# Patient Record
Sex: MALE | Race: WHITE
[De-identification: names, ages, dates, MRNs, and addresses within clinical notes are randomized per-mention and may not be internally consistent; named-entity substitution may affect disease eponyms.]

---

## 2017-12-26 ENCOUNTER — HOSPITAL ENCOUNTER (OUTPATIENT)
Dept: HOSPITAL 47 - LABPAT | Age: 72
Discharge: HOME | End: 2017-12-26
Payer: MEDICARE

## 2017-12-26 DIAGNOSIS — I35.0: ICD-10-CM

## 2017-12-26 DIAGNOSIS — Z01.812: Primary | ICD-10-CM

## 2017-12-26 LAB
ANION GAP SERPL CALC-SCNC: 5 MMOL/L
BUN SERPL-SCNC: 29 MG/DL (ref 9–20)
CH: 30.7
CHCM: 32.7
CHLORIDE SERPL-SCNC: 104 MMOL/L (ref 98–107)
CO2 SERPL-SCNC: 30 MMOL/L (ref 22–30)
ERYTHROCYTE [DISTWIDTH] IN BLOOD BY AUTOMATED COUNT: 4.72 M/UL (ref 4.3–5.9)
ERYTHROCYTE [DISTWIDTH] IN BLOOD: 14 % (ref 11.5–15.5)
HCT VFR BLD AUTO: 44.6 % (ref 39–53)
HDW: 3.14
HGB BLD-MCNC: 14.7 GM/DL (ref 13–17.5)
MCH RBC QN AUTO: 31.2 PG (ref 25–35)
MCHC RBC AUTO-ENTMCNC: 33 G/DL (ref 31–37)
MCV RBC AUTO: 94.4 FL (ref 80–100)
NON-AFRICAN AMERICAN GFR(MDRD): >60
POTASSIUM SERPL-SCNC: 5.3 MMOL/L (ref 3.5–5.1)
SODIUM SERPL-SCNC: 139 MMOL/L (ref 137–145)
WBC # BLD AUTO: 7.2 K/UL (ref 3.8–10.6)

## 2017-12-26 PROCEDURE — 80051 ELECTROLYTE PANEL: CPT

## 2017-12-26 PROCEDURE — 36415 COLL VENOUS BLD VENIPUNCTURE: CPT

## 2017-12-26 PROCEDURE — 84520 ASSAY OF UREA NITROGEN: CPT

## 2017-12-26 PROCEDURE — 85027 COMPLETE CBC AUTOMATED: CPT

## 2017-12-26 PROCEDURE — 82565 ASSAY OF CREATININE: CPT

## 2018-01-02 ENCOUNTER — HOSPITAL ENCOUNTER (OUTPATIENT)
Dept: HOSPITAL 47 - CATHCVL | Age: 73
Discharge: HOME | End: 2018-01-02
Payer: MEDICARE

## 2018-01-02 VITALS — BODY MASS INDEX: 28.7 KG/M2

## 2018-01-02 DIAGNOSIS — I08.0: ICD-10-CM

## 2018-01-02 DIAGNOSIS — Z82.49: ICD-10-CM

## 2018-01-02 DIAGNOSIS — I10: ICD-10-CM

## 2018-01-02 DIAGNOSIS — E78.00: ICD-10-CM

## 2018-01-02 DIAGNOSIS — D86.9: ICD-10-CM

## 2018-01-02 DIAGNOSIS — Z88.0: ICD-10-CM

## 2018-01-02 DIAGNOSIS — Z79.51: ICD-10-CM

## 2018-01-02 DIAGNOSIS — Z87.891: ICD-10-CM

## 2018-01-02 DIAGNOSIS — Z88.6: ICD-10-CM

## 2018-01-02 DIAGNOSIS — I27.20: Primary | ICD-10-CM

## 2018-01-02 DIAGNOSIS — Z88.1: ICD-10-CM

## 2018-01-02 DIAGNOSIS — Z79.899: ICD-10-CM

## 2018-01-02 DIAGNOSIS — Z79.82: ICD-10-CM

## 2018-01-02 LAB
SAO2 % BLDA: 63.2 %
SAO2 % BLDA: 70.4 %
SAO2 % BLDA: 93.2 %

## 2018-01-02 PROCEDURE — 93456 R HRT CORONARY ARTERY ANGIO: CPT

## 2018-01-02 PROCEDURE — 93320 DOPPLER ECHO COMPLETE: CPT

## 2018-01-02 PROCEDURE — 93312 ECHO TRANSESOPHAGEAL: CPT

## 2018-01-02 PROCEDURE — 93325 DOPPLER ECHO COLOR FLOW MAPG: CPT

## 2018-01-02 PROCEDURE — 85018 HEMOGLOBIN: CPT

## 2018-01-02 PROCEDURE — 82810 BLOOD GASES O2 SAT ONLY: CPT

## 2018-01-02 RX ADMIN — MIDAZOLAM ONE MG: 1 INJECTION INTRAMUSCULAR; INTRAVENOUS at 07:54

## 2018-01-02 RX ADMIN — BENZOCAINE ONE SPRAY: 200 SPRAY DENTAL; ORAL; PERIODONTAL at 11:38

## 2018-01-02 RX ADMIN — BENZOCAINE ONE SPRAY: 200 SPRAY DENTAL; ORAL; PERIODONTAL at 13:17

## 2018-01-02 RX ADMIN — BENZOCAINE ONE SPRAY: 200 SPRAY DENTAL; ORAL; PERIODONTAL at 13:23

## 2018-01-02 RX ADMIN — MIDAZOLAM ONE MG: 1 INJECTION INTRAMUSCULAR; INTRAVENOUS at 07:40

## 2018-01-02 NOTE — ECHOT
TRANSESOPHAGEAL ECHOCARDIOGRAM



Mr. Brar is a 72-year-old gentleman who underwent a transesophageal echocardiogram

to assess aortic stenosis.  Patient was given intravenous sedation with Versed and

fentanyl and transesophageal echocardiogram was performed without any complications.

Left ventricular chamber is normal in size with moderate degree of left ventricular

hypertrophy and normal left ventricular systolic function.  The mitral valve morphology

is normal.  Left atrium is mildly enlarged.  Left atrial appendage is normal.  There is

minimal mitral regurgitation noted.  Aortic valve is heavily calcified.  Aortic valve

area is calculated in the range of 1.4 to 1.5 cm2 suggestive of moderate degree of

aortic stenosis.  No significant aortic regurgitation is noted.  Interatrial septum is

intact.  There is no evidence of any PFO.  Descending thoracic aorta is normal.



FINAL IMPRESSION:

1. The aortic valve is heavily calcified but aortic wall area is calculated in the

    range of 1.4 to 1.5 cm2 suggestive of moderate degree of aortic stenosis.  No

    significant aortic regurgitation is noted.

2. Mitral and tricuspid valve morphology is normal.  There is a trace mitral

    regurgitation noted.  Left atrium is mildly enlarged.

3. Interatrial septum is intact.  There is no evidence of any patent foramen ovale.

4. Descending thoracic aorta was normal.





MMODL / IJN: [id number] / Job#: [id number]

## 2018-01-02 NOTE — LTR
DATE OF SERVICE:

01/02/18



Dear Dr. Cassidy:



Thank you for the opportunity to partake in the care of Mr. Brar.  Please find

enclosed my cardiac cath report for your records.



As you recall, the gentleman has hypertension, hypercholesterolemia and moderate to

severe aortic stenosis.



Cardiac cath reveals no significant obstructive coronary artery disease.  Filling

pressures are slightly elevated.  We will check the FAIZAN and then make further

recommendations.



Thank you for your referral and please call for questions.



With kind personal regards,



Sincerely,





Moderate conscious sedation time was 48 minutes.





MMODL / IJN: [id number] / Job#: [id number]

## 2018-01-02 NOTE — CC
CARDIAC CATHETERIZATION REPORT



DATE OF SERVICE:

01/02/2018.



PROCEDURE:

Right and left heart catheterization and coronary angiography.



PERFORMED BY:

Dr. POORNIMA Garcia.



CLINICAL INFORMATION:

Mr. Link Brar is a 72-year-old gentleman with history of hypertension,

hyperlipidemia, and aortic stenosis with increasing shortness of breath and worsening

gradient noninvasively with a mean gradient of 37 mm mmHg.  He was advised coronary

angiography.  He had a previous cardiac cath and FAIZAN in 2014, which revealed that the

valve area was about nearly 2.0 and did not have significant disease of the right-

dominant system.  Because of increasing symptoms and objective evidence of increased

gradient, he was advised coronary angiography and a FAIZAN and possibly to cross aortic

valve if he could.



PROCEDURE NOTE:

Under local anesthesia and strict aseptic precautions, a 6-Romansh introducer was placed

in the right femoral artery.  An 8-Romansh introducer in the right femoral vein.  Using

a standard balloon tipped catheter, I performed right heart catheterization.  Catheter

was left in the pulmonary artery.  I obtained saturations, hemodynamics and also

performed a thermodilution cardiac output.  I then performed coronary angiography using

standard Candy catheters.  I made an attempt to cross the aortic valve with a pigtail

catheter but I could not.  The sheath was taken out and Angio-Seal device used to

secure hemostasis.  The venous sheath was taken out and manual compression.  The

patient was sent to the room in a stable condition.  He will have transesophageal echo

later on today around noontime.



CARDIAC CATHETERIZATION FINDINGS:

1. Right coronary artery:  Technically a very dominant vessel has no significant

    disease and distally bifurcates into a large PDA and a larger PLV and PDA, both of

    which supply a sizable amount of myocardium.  There is no significant disease in

    the dominant RCA or its branches.

2. Left main coronary artery: Short patent disease-free vessel that bifurcates into

    LAD and circumflex.  Left main itself is free of significant disease.

3. Left anterior descending coronary artery:  Good caliber vessel extends along the

    anterior wall gives off a good-sized septal branch proximally and then 2 small

    diagonal branches.  The second diagonal is actually of fair caliber and

    distribution.  The vessel then runs down all the way to the apex supplying a

    sizable amount of myocardium.  The entire LAD is of a good caliber and good

    distribution, has minor irregularities but no significant disease is noted.

4. Left posterior circumflex coronary artery:  Technically a nondominant vessel that

    runs laterally, gives off 2 obtuse marginal branches, supplies a fair amount of

    myocardium.  There is no significant disease in the nondominant circumflex system.

5. Left ventriculogram.  This was not performed.  I could not cross the aortic valve.

6. Right heart catheterization findings:  Right atrial pressure was 3 mmHg.  Right

    ventricular pressure was 44/3, the pulmonary arterial pressure was 44/17 with a

    mean of 27.  Pulmonary capillary wedge pressure was 16 mmHg.  The thermodilution

    cardiac output was 5.4 L.  The [place] cardiac output was 5.5 L.  The PA saturation

    was 70% and FA saturation was 93%.  The RA saturation was 63%.

This patient has mild pulmonary hypertension with a normal cardiac output and mildly

increased wedge pressure.



FINAL IMPRESSION:

This patient has mild pulmonary hypertension, normal cardiac output.  Aortic valve was

not crossed.  He has no obstructive CAD.  He has a right dominant system and compared

to the study from 2014,  there is no progression of coronary disease.



RECOMMENDATIONS:

I will await the results of the transesophageal echo before making recommendations.

Patient's PA pressures are slightly increased, but his coronary anatomy has not

changed.  He has a right dominant system without significant obstructive CAD.  Findings

were discussed with the patient and wife and he will be discharged after FAIZAN and I will

see him on Friday as per his appointment.





MMODL / IJN: [id number] / Job#: [id number]

## 2018-01-03 VITALS
SYSTOLIC BLOOD PRESSURE: 174 MMHG | HEART RATE: 53 BPM | DIASTOLIC BLOOD PRESSURE: 76 MMHG | TEMPERATURE: 97.9 F | RESPIRATION RATE: 18 BRPM

## 2018-01-22 ENCOUNTER — HOSPITAL ENCOUNTER (OUTPATIENT)
Dept: HOSPITAL 47 - LABWHC1 | Age: 73
Discharge: HOME | End: 2018-01-22
Payer: MEDICARE

## 2018-01-22 DIAGNOSIS — E78.5: Primary | ICD-10-CM

## 2018-01-22 DIAGNOSIS — Z12.5: ICD-10-CM

## 2018-01-22 DIAGNOSIS — I10: ICD-10-CM

## 2018-01-22 LAB
ALT SERPL-CCNC: 40 U/L (ref 21–72)
ANION GAP SERPL CALC-SCNC: 7 MMOL/L
AST SERPL-CCNC: 19 U/L (ref 17–59)
BASOPHILS # BLD AUTO: 0.1 K/UL (ref 0–0.2)
BASOPHILS NFR BLD AUTO: 1 %
BUN SERPL-SCNC: 24 MG/DL (ref 9–20)
CALCIUM SPEC-MCNC: 10.6 MG/DL (ref 8.4–10.2)
CHLORIDE SERPL-SCNC: 103 MMOL/L (ref 98–107)
CHOLEST SERPL-MCNC: 152 MG/DL (ref ?–200)
CO2 SERPL-SCNC: 33 MMOL/L (ref 22–30)
EOSINOPHIL # BLD AUTO: 0.2 K/UL (ref 0–0.7)
EOSINOPHIL NFR BLD AUTO: 3 %
ERYTHROCYTE [DISTWIDTH] IN BLOOD BY AUTOMATED COUNT: 5.37 M/UL (ref 4.3–5.9)
ERYTHROCYTE [DISTWIDTH] IN BLOOD: 13.8 % (ref 11.5–15.5)
GLUCOSE SERPL-MCNC: 109 MG/DL (ref 74–99)
HCT VFR BLD AUTO: 49.2 % (ref 39–53)
HDLC SERPL-MCNC: 52 MG/DL (ref 40–60)
HGB BLD-MCNC: 16 GM/DL (ref 13–17.5)
LDLC SERPL CALC-MCNC: 73 MG/DL (ref 0–99)
LYMPHOCYTES # SPEC AUTO: 1.6 K/UL (ref 1–4.8)
LYMPHOCYTES NFR SPEC AUTO: 23 %
MCH RBC QN AUTO: 29.9 PG (ref 25–35)
MCHC RBC AUTO-ENTMCNC: 32.6 G/DL (ref 31–37)
MCV RBC AUTO: 91.6 FL (ref 80–100)
MONOCYTES # BLD AUTO: 0.5 K/UL (ref 0–1)
MONOCYTES NFR BLD AUTO: 7 %
NEUTROPHILS # BLD AUTO: 4.2 K/UL (ref 1.3–7.7)
NEUTROPHILS NFR BLD AUTO: 64 %
PLATELET # BLD AUTO: 207 K/UL (ref 150–450)
POTASSIUM SERPL-SCNC: 4.9 MMOL/L (ref 3.5–5.1)
PSA SERPL-MCNC: 1.29 NG/ML (ref 0–4)
SODIUM SERPL-SCNC: 143 MMOL/L (ref 137–145)
T4 FREE SERPL-MCNC: 1.12 NG/DL (ref 0.78–2.19)
TRIGL SERPL-MCNC: 137 MG/DL (ref ?–150)
WBC # BLD AUTO: 6.6 K/UL (ref 3.8–10.6)

## 2018-01-22 PROCEDURE — 85025 COMPLETE CBC W/AUTO DIFF WBC: CPT

## 2018-01-22 PROCEDURE — 84443 ASSAY THYROID STIM HORMONE: CPT

## 2018-01-22 PROCEDURE — 84439 ASSAY OF FREE THYROXINE: CPT

## 2018-01-22 PROCEDURE — 36415 COLL VENOUS BLD VENIPUNCTURE: CPT

## 2018-01-22 PROCEDURE — 84460 ALANINE AMINO (ALT) (SGPT): CPT

## 2018-01-22 PROCEDURE — 80061 LIPID PANEL: CPT

## 2018-01-22 PROCEDURE — 80048 BASIC METABOLIC PNL TOTAL CA: CPT

## 2018-01-22 PROCEDURE — 84450 TRANSFERASE (AST) (SGOT): CPT

## 2019-02-15 ENCOUNTER — HOSPITAL ENCOUNTER (OUTPATIENT)
Dept: HOSPITAL 47 - RADXRMAIN | Age: 74
Discharge: HOME | End: 2019-02-15
Attending: FAMILY MEDICINE
Payer: MEDICARE

## 2019-02-15 DIAGNOSIS — M25.552: Primary | ICD-10-CM

## 2019-02-15 PROCEDURE — 73502 X-RAY EXAM HIP UNI 2-3 VIEWS: CPT

## 2019-02-15 NOTE — XR
Left hip

 

HISTORY: Left hip pain

 

2 views of the left hip

 

No comparisons

 

There is mild joint space loss. Alignment and bone mineralization are maintained. No fracture or disl
ocation.

 

IMPRESSION: Suspect some mild joint space loss, hip MRI may be of benefit.

## 2019-03-05 ENCOUNTER — HOSPITAL ENCOUNTER (OUTPATIENT)
Dept: HOSPITAL 47 - ORWHC2ENDO | Age: 74
Discharge: HOME | End: 2019-03-05
Payer: MEDICARE

## 2019-03-05 VITALS — RESPIRATION RATE: 16 BRPM | HEART RATE: 58 BPM | DIASTOLIC BLOOD PRESSURE: 81 MMHG | SYSTOLIC BLOOD PRESSURE: 145 MMHG

## 2019-03-05 VITALS — TEMPERATURE: 96.5 F

## 2019-03-05 VITALS — BODY MASS INDEX: 30.9 KG/M2

## 2019-03-05 DIAGNOSIS — D12.5: ICD-10-CM

## 2019-03-05 DIAGNOSIS — K57.30: ICD-10-CM

## 2019-03-05 DIAGNOSIS — Z12.11: Primary | ICD-10-CM

## 2019-03-05 DIAGNOSIS — K59.00: ICD-10-CM

## 2019-03-05 DIAGNOSIS — Z80.0: ICD-10-CM

## 2019-03-05 DIAGNOSIS — K64.8: ICD-10-CM

## 2019-03-05 PROCEDURE — 88305 TISSUE EXAM BY PATHOLOGIST: CPT

## 2019-03-05 PROCEDURE — 45385 COLONOSCOPY W/LESION REMOVAL: CPT

## 2019-03-05 NOTE — P.PCN
Date of Procedure: 03/05/19


Procedure(s) Performed: 


Procedure: Colonoscopy and polypectomy.





Preoperative diagnosis: Screening for neoplasia.





Postoperative diagnosis: 1. Diverticulosis with no evidence of acute 

diverticulitis or strictures.  2. Small sigmoid polyp snared but no large 

polyps or cancer.





Preparation: HalfLytely prep.





Sedation: Was provided by anesthesia.





Brief clinical history: The patient is 73-year-old male who is scheduled for 

this evaluation for screening for neoplasia because of history of polyps and 

family history of colon cancer in his mother and her half brother.  The patient 

has no abdominal complaints, bleeding or anemia.  He reports intermittent 

issues with diarrhea and constipation.  His last exam was in 2016.





Procedure: With the patient on his left lateral decubitus position and after 

informed consent and adequate sedation, the perianal area was inspected and it 

did not show any fissures or fistulas.  There were no masses felt on digital 

rectal examination.  The Olympus CFQ 160L video colonoscope was then inserted 

in the rectum in the usual fashion and advanced to the cecum.  The preparation 

was less than ideal as there was some thick fecal secretions and fecal debris 

that would not wash off totally.  There was a small polyp in the sigmoid which 

was snared and retrieved by suction, but there were no large polyps or cancer.  

Multiple diverticular orifices were seen scattered mostly on the left side with 

some on the right side and around the hepatic flexure but with no evidence of 

acute diverticulitis or strictures.  In the retroflex view in the rectum I 

noted low-grade internal hemorrhoids but those were not bleeding.





Disposition: The patient tolerated the procedure well.





Plan: The patient was reassured.  Discussed dietary measures.  I anticipate 

repeating this exam in 3-5 years.  He will follow up with you as planned.

## 2020-03-09 ENCOUNTER — HOSPITAL ENCOUNTER (OUTPATIENT)
Dept: HOSPITAL 47 - LABPAT | Age: 75
End: 2020-03-09
Attending: INTERNAL MEDICINE
Payer: MEDICARE

## 2020-03-09 DIAGNOSIS — Z01.818: Primary | ICD-10-CM

## 2020-03-09 DIAGNOSIS — I35.0: ICD-10-CM

## 2020-03-09 LAB
ANION GAP SERPL CALC-SCNC: 6 MMOL/L
BUN SERPL-SCNC: 25 MG/DL (ref 9–20)
CHLORIDE SERPL-SCNC: 100 MMOL/L (ref 98–107)
CO2 SERPL-SCNC: 34 MMOL/L (ref 22–30)
ERYTHROCYTE [DISTWIDTH] IN BLOOD BY AUTOMATED COUNT: 5 M/UL (ref 4.3–5.9)
ERYTHROCYTE [DISTWIDTH] IN BLOOD: 14.5 % (ref 11.5–15.5)
HCT VFR BLD AUTO: 46.2 % (ref 39–53)
HGB BLD-MCNC: 15.4 GM/DL (ref 13–17.5)
MCH RBC QN AUTO: 30.7 PG (ref 25–35)
MCHC RBC AUTO-ENTMCNC: 33.3 G/DL (ref 31–37)
MCV RBC AUTO: 92.4 FL (ref 80–100)
PLATELET # BLD AUTO: 237 K/UL (ref 150–450)
POTASSIUM SERPL-SCNC: 4.9 MMOL/L (ref 3.5–5.1)
SODIUM SERPL-SCNC: 140 MMOL/L (ref 137–145)
WBC # BLD AUTO: 10.4 K/UL (ref 3.8–10.6)

## 2020-03-09 PROCEDURE — 85027 COMPLETE CBC AUTOMATED: CPT

## 2020-03-09 PROCEDURE — 36415 COLL VENOUS BLD VENIPUNCTURE: CPT

## 2020-03-09 PROCEDURE — 82565 ASSAY OF CREATININE: CPT

## 2020-03-09 PROCEDURE — 80051 ELECTROLYTE PANEL: CPT

## 2020-03-09 PROCEDURE — 84520 ASSAY OF UREA NITROGEN: CPT

## 2020-03-19 ENCOUNTER — HOSPITAL ENCOUNTER (OUTPATIENT)
Dept: HOSPITAL 47 - CATHCVL | Age: 75
Discharge: HOME | End: 2020-03-19
Attending: INTERNAL MEDICINE
Payer: MEDICARE

## 2020-03-19 VITALS — HEART RATE: 60 BPM | DIASTOLIC BLOOD PRESSURE: 79 MMHG | SYSTOLIC BLOOD PRESSURE: 165 MMHG

## 2020-03-19 VITALS — TEMPERATURE: 98.5 F

## 2020-03-19 VITALS — BODY MASS INDEX: 31.2 KG/M2

## 2020-03-19 VITALS — RESPIRATION RATE: 16 BRPM

## 2020-03-19 DIAGNOSIS — R06.02: ICD-10-CM

## 2020-03-19 DIAGNOSIS — D86.9: ICD-10-CM

## 2020-03-19 DIAGNOSIS — Z87.891: ICD-10-CM

## 2020-03-19 DIAGNOSIS — Z79.51: ICD-10-CM

## 2020-03-19 DIAGNOSIS — E78.00: ICD-10-CM

## 2020-03-19 DIAGNOSIS — Z79.899: ICD-10-CM

## 2020-03-19 DIAGNOSIS — Z82.49: ICD-10-CM

## 2020-03-19 DIAGNOSIS — R07.9: ICD-10-CM

## 2020-03-19 DIAGNOSIS — Z88.1: ICD-10-CM

## 2020-03-19 DIAGNOSIS — Z88.6: ICD-10-CM

## 2020-03-19 DIAGNOSIS — J44.9: ICD-10-CM

## 2020-03-19 DIAGNOSIS — Z79.82: ICD-10-CM

## 2020-03-19 DIAGNOSIS — Z88.0: ICD-10-CM

## 2020-03-19 DIAGNOSIS — I49.5: ICD-10-CM

## 2020-03-19 DIAGNOSIS — I77.1: ICD-10-CM

## 2020-03-19 DIAGNOSIS — I08.0: Primary | ICD-10-CM

## 2020-03-19 DIAGNOSIS — I10: ICD-10-CM

## 2020-03-19 DIAGNOSIS — E78.5: ICD-10-CM

## 2020-03-19 PROCEDURE — 93454 CORONARY ARTERY ANGIO S&I: CPT

## 2020-03-19 PROCEDURE — 93312 ECHO TRANSESOPHAGEAL: CPT

## 2020-03-19 PROCEDURE — 93320 DOPPLER ECHO COMPLETE: CPT

## 2020-03-19 PROCEDURE — 93325 DOPPLER ECHO COLOR FLOW MAPG: CPT

## 2020-03-19 RX ADMIN — BENZOCAINE ONE SPRAY: 200 SPRAY DENTAL; ORAL; PERIODONTAL at 11:35

## 2020-03-19 RX ADMIN — VERAPAMIL HYDROCHLORIDE ONE ML: 2.5 INJECTION, SOLUTION INTRAVENOUS at 08:51

## 2020-03-19 RX ADMIN — BENZOCAINE ONE SPRAY: 200 SPRAY DENTAL; ORAL; PERIODONTAL at 11:40

## 2020-03-19 RX ADMIN — VERAPAMIL HYDROCHLORIDE ONE ML: 2.5 INJECTION, SOLUTION INTRAVENOUS at 08:09

## 2020-03-19 RX ADMIN — MIDAZOLAM ONE MG: 1 INJECTION INTRAMUSCULAR; INTRAVENOUS at 07:54

## 2020-03-19 RX ADMIN — MIDAZOLAM ONE MG: 1 INJECTION INTRAMUSCULAR; INTRAVENOUS at 08:07

## 2020-03-19 NOTE — CC
CARDIAC CATHETERIZATION REPORT



DATE OF SERVICE:

March 19, 2020.



PROCEDURE:

Coronary angiography.



Moderate conscious sedation time was 47 minutes. Patient was administered Versed.

Oxygen saturation, hemodynamics and EKG were monitored closely.



PERFORMED BY:

Dr. POORNIMA Garcia.



CLINICAL INFORMATION:

Mr. Brar is a 74-year-old gentleman, history of sarcoidosis, bronchial asthma COPD,

past history of smoking who also has moderate to severe aortic stenosis with

progressive increase in symptoms of chest pain and shortness of breath with activity.

He was advised coronary angiography with the understanding that if aortic valve is

severe, he may require a valve replacement.  He was scheduled to have a coronary

angiography and FAIZAN today brought in for the procedure electively after due discussion

regarding risks, benefits, and options.



PROCEDURE NOTE:

Under local anesthesia and strict aseptic precautions, a 6-Uruguayan introducer was placed

in the right radial artery.  I was able to place a 6-[place]ruguayan sheath.  Multiple attempts

were used because of difficulty in advancing the wire.  However, I advanced a Glidewire

and there was a lot of tortuosity in the axillary and brachial artery area.  After

gaining access into the ascending aorta with a Glidewire, I switched over to a regular

wire with a right Candy catheter.  I had absolutely no ability to torque the right

catheter because of extreme tortuosity.  However, I had a subselective injection of the

RCA.  I could not advance the left catheter because of extreme tortuosity in the

brachiocephalic artery.  Therefore I switched over to the femoral approach.  Under

strict aseptic precautions and local anesthesia, a 6-[place] introducer was placed in

the right femoral artery.  Using standard Candy catheters I performed coronary

angiography.  I did not make any serious attempt to cross the aortic valve.  The sheath

was taken out and Angio-Seal device used to secure hemostasis.  The radial sheath was

taken out and a TR band applied as per protocol.  Saturation of the fingers of the

right hand was 92%.  The patient tolerated the procedure well without complication.  He

was sent to the room in stable condition.  Findings were discussed with the patient and

family.



CORONARY ANGIOGRAPHY FINDINGS:

RIGHT CORONARY ARTERY: This is a technically a very dominant vessel has no significant

disease, has minor irregularities.  It bifurcates distally into a large PDA and PLV,

both of which supply a sizable amount of myocardium.  No significant disease is noted

in the right coronary artery, which is a super dominant vessel.



LEFT MAIN CORONARY ARTERY: This is a short patent disease-free vessel that bifurcates

into LAD and circumflex.



LEFT ANTERIOR DESCENDING CORONARY ARTERY: Good caliber vessel extends along the

anterior wall, gives off septal and diagonal branches runs all the way to the apex.  No

significant disease is noted in the LAD system.



LEFT POSTERIOR CIRCUMFLEX CORONARY ARTERY: Nondominant vessel,  fair caliber, gives off

2 large obtuse marginals and then an AV groove branch. There are minor irregularities.

No significant disease.



FINAL IMPRESSION:

This patient has a right dominant system. No significant obstructive coronary artery

disease.  Left ventriculogram was not performed and aortic valve was not crossed.  He

will have a FAIZAN and we will then make a definitive recommendation.



RECOMMENDATIONS:

No significant CAD.  We will await the results of FAIZAN and then decide regarding aortic

valve replacement.  Discussed with the patient and wife. I expect he will be discharged

later on today if he remains stable.





MMODL / IJN: [id number] / Job#: [id number]

## 2020-03-19 NOTE — ECHOT
TRANSESOPHAGEAL ECHOCARDIOGRAM



This transesophageal echocardiogram was performed to assess the aortic stenosis.  The

patient was given intravenous sedation with Versed and fentanyl and transesophageal

echocardiogram was performed without any complications.



FINDINGS:

The left ventricular chamber is normal in size with mild-to-moderate degree of left

ventricular hypertrophy and normal left ventricular systolic functions. Left atrium is

mildly enlarged.  Mitral valve morphology is normal.  Mild mitral regurgitation is

noted.  There is no evidence of thrombus in the left atrial appendage.  The aortic

valve is sclerotic and calcified.  It is probably bicuspid and it was difficult to

calculate the aortic valve area by planimetry. In the long-axis view, the excursion of

the aortic leaflets is significantly reduced and the valve is calcified.  There is a

moderate degree of aortic regurgitation. Interatrial septum is intact.  The descending

thoracic aorta shows mild atherosclerotic plaque.



FINAL IMPRESSION:

1. The aortic valve is heavily calcified and sclerotic.  It is probably bicuspid.  It

    was difficult to calculate the aortic valve area in 60 degree view. In the long-

    axis view, the excursion of the aortic leaflets is significantly reduced suggestive

    of severe aortic stenosis.  A mean gradient of 40 mmHg was noted by transthoracic

    echo again suggestive of severe aortic stenosis.  There is a moderate degree of

    aortic regurgitation noted.

2. Left ventricular systolic function is normal.

3. Mild mitral regurgitation is noted.

4. Interatrial septum is intact.

5. There is no evidence of any patent foramen ovale.

6. There is no evidence of thrombus in left atrial appendage.





MMODL / IJN: [id number] / Job#: [id number]

## 2020-04-29 ENCOUNTER — HOSPITAL ENCOUNTER (OUTPATIENT)
Dept: HOSPITAL 47 - LABWHC1 | Age: 75
Discharge: HOME | End: 2020-04-29
Attending: INTERNAL MEDICINE
Payer: MEDICARE

## 2020-04-29 DIAGNOSIS — I51.9: ICD-10-CM

## 2020-04-29 DIAGNOSIS — I35.0: Primary | ICD-10-CM

## 2020-04-29 LAB
ALBUMIN SERPL-MCNC: 4 G/DL (ref 3.8–4.9)
ALBUMIN/GLOB SERPL: 2.22 G/DL (ref 1.6–3.17)
ALP SERPL-CCNC: 93 U/L (ref 41–126)
ALT SERPL-CCNC: 22 U/L (ref 10–49)
ANION GAP SERPL CALC-SCNC: 8.4 MMOL/L (ref 4–12)
AST SERPL-CCNC: 17 U/L (ref 14–35)
BUN SERPL-SCNC: 28 MG/DL (ref 9–27)
BUN/CREAT SERPL: 23.33 RATIO (ref 12–20)
CALCIUM SPEC-MCNC: 10.5 MG/DL (ref 8.7–10.3)
CHLORIDE SERPL-SCNC: 106 MMOL/L (ref 96–109)
CO2 SERPL-SCNC: 32.6 MMOL/L (ref 21.6–31.8)
ERYTHROCYTE [DISTWIDTH] IN BLOOD BY AUTOMATED COUNT: 5 M/UL (ref 4.3–5.9)
ERYTHROCYTE [DISTWIDTH] IN BLOOD: 14.2 % (ref 11.5–15.5)
GLOBULIN SER CALC-MCNC: 1.8 G/DL (ref 1.6–3.3)
GLUCOSE SERPL-MCNC: 106 MG/DL (ref 70–110)
HCT VFR BLD AUTO: 47.4 % (ref 39–53)
HGB BLD-MCNC: 15.5 GM/DL (ref 13–17.5)
INR PPP: 1 (ref ?–1.2)
MAGNESIUM SPEC-SCNC: 2.1 MG/DL (ref 1.5–2.4)
MCH RBC QN AUTO: 30.9 PG (ref 25–35)
MCHC RBC AUTO-ENTMCNC: 32.6 G/DL (ref 31–37)
MCV RBC AUTO: 94.8 FL (ref 80–100)
PLATELET # BLD AUTO: 186 K/UL (ref 150–450)
POTASSIUM SERPL-SCNC: 4.8 MMOL/L (ref 3.5–5.5)
PROT SERPL-MCNC: 5.8 G/DL (ref 6.2–8.2)
PT BLD: 10.1 SEC (ref 9–12)
SODIUM SERPL-SCNC: 147 MMOL/L (ref 135–145)
WBC # BLD AUTO: 7.7 K/UL (ref 3.8–10.6)

## 2020-04-29 PROCEDURE — 80053 COMPREHEN METABOLIC PANEL: CPT

## 2020-04-29 PROCEDURE — 83735 ASSAY OF MAGNESIUM: CPT

## 2020-04-29 PROCEDURE — 83880 ASSAY OF NATRIURETIC PEPTIDE: CPT

## 2020-04-29 PROCEDURE — 85027 COMPLETE CBC AUTOMATED: CPT

## 2020-04-29 PROCEDURE — 36415 COLL VENOUS BLD VENIPUNCTURE: CPT

## 2020-04-29 PROCEDURE — 85610 PROTHROMBIN TIME: CPT

## 2021-12-28 ENCOUNTER — HOSPITAL ENCOUNTER (OUTPATIENT)
Dept: HOSPITAL 47 - RADXRMAIN | Age: 76
Discharge: HOME | End: 2021-12-28
Attending: FAMILY MEDICINE
Payer: MEDICARE

## 2021-12-28 DIAGNOSIS — R06.02: Primary | ICD-10-CM

## 2021-12-28 PROCEDURE — 71046 X-RAY EXAM CHEST 2 VIEWS: CPT

## 2021-12-28 NOTE — XR
EXAMINATION TYPE: XR chest 2V

 

DATE OF EXAM: 12/28/2021

 

COMPARISON: 8/14/2014

 

HISTORY: Shortness of breath

 

TECHNIQUE:  Frontal and lateral views of the chest are obtained.

 

FINDINGS:

 

Scattered senescent parenchymal changes noted. Hyperinflation compatible with COPD. 

 

Increased perihilar and basilar interstitial markings could reflect atypical pneumonia. Correlate cli
nically. No evidence for atelectasis.

 

Heart size is stable.

 

Mediastinal structures are stable and grossly unremarkable.

 

No evidence for hilar prominence.

 

Degenerative changes dorsal spine. 

 

IMPRESSION:

1. Increased perihilar and basilar interstitial markings could reflect atypical pneumonia. Correlate 
clinically.

## 2022-01-10 ENCOUNTER — HOSPITAL ENCOUNTER (OUTPATIENT)
Dept: HOSPITAL 47 - LABWHC1 | Age: 77
Discharge: HOME | End: 2022-01-10
Attending: FAMILY MEDICINE
Payer: MEDICARE

## 2022-01-10 DIAGNOSIS — Z20.822: Primary | ICD-10-CM

## 2022-01-10 DIAGNOSIS — R06.00: ICD-10-CM

## 2022-03-04 ENCOUNTER — HOSPITAL ENCOUNTER (OUTPATIENT)
Dept: HOSPITAL 47 - RADCTMAIN | Age: 77
Discharge: HOME | End: 2022-03-04
Attending: FAMILY MEDICINE
Payer: MEDICARE

## 2022-03-04 DIAGNOSIS — N40.0: ICD-10-CM

## 2022-03-04 DIAGNOSIS — N28.1: Primary | ICD-10-CM

## 2022-03-04 LAB — BUN SERPL-SCNC: 23 MG/DL (ref 9–20)

## 2022-03-04 PROCEDURE — 36415 COLL VENOUS BLD VENIPUNCTURE: CPT

## 2022-03-04 PROCEDURE — 82565 ASSAY OF CREATININE: CPT

## 2022-03-04 PROCEDURE — 74177 CT ABD & PELVIS W/CONTRAST: CPT

## 2022-03-04 PROCEDURE — 84520 ASSAY OF UREA NITROGEN: CPT

## 2022-03-04 NOTE — CT
EXAMINATION TYPE: CT abdomen pelvis w con

 

DATE OF EXAM: 3/4/2022

 

COMPARISON: Ultrasound dated 1/18/2022

 

HISTORY: H/O RENAL CYST

 

CT DLP: 1814 mGycm

Automated exposure control for dose reduction was used.

 

TECHNIQUE:  Helical acquisition of images was performed from the lung bases through the pelvis.

 

CONTRAST: 

Performed with Oral Contrast and with IV Contrast, patient injected with 80 mL of Isovue 300.

 

FINDINGS: 

 

LUNG BASES: Bilateral basal mild fibrotic changes, groundglass opacities and peripheral reticulations
.

 

LIVER/GB: No definite hepatic focal lesion. Previous cholecystectomy.

 

PANCREAS: 7 mm pancreatic head hypodensity, possibly artifactual, otherwise unremarkable pancreas.

 

SPLEEN: No significant abnormality is seen.

 

ADRENALS: Diffusely thickened adrenals without definite measurable lesion.

 

KIDNEYS: 8 mm left lower pole cortical renal cyst demonstrating a density of less than 5 Hounsfield u
nit in the venous and delayed images consistent with benign cyst. No solid component, calcification o
r suspicious feature. Millimetric right renal hypodensities, too small to characterize and possibly r
epresenting renal cysts. Questionable 2 mm nonobstructing calculus at the lower pole of the right kid
jez. Unremarkable kidneys otherwise.

 

FREE AIR:  No free air is visualized.

 

ADENOPATHY:  10 mm lymph node is seen in the gastrohepatic ligament, nonspecific. Scattered bilateral
 subcentimeter inguinal lymph nodes. No pathologically enlarged lymph nodes in the abdomen or the pel
vis otherwise.

 

REPRODUCTIVE ORGANS: Enlarged heterogeneous prostate, please correlate with PSA level. Unremarkable s
eminal vesicles.

 

URINARY BLADDER:  Distended without definite lesion.

 

OSSEOUS STRUCTURES:  Osteopenia. Dextroscoliosis of the thoracolumbar junction. Severe degenerative c
hanges of the lower lumbar spine with facet osteoarthropathy. No aggressive bone lesion.

 

BOWEL:  Unremarkable nondistended stomach, duodenum and small bowel. Colonic diverticulosis most evid
ent involving the sigmoid colon and descending colon.

 

OTHER: Scattered arterial atherosclerotic calcifications. No sizable ascites. Bilateral fat-containin
g small inguinal hernias. Fat-containing umbilical hernia with overlying midline anterior abdominal w
all skin thickening and subcutaneous fat stranding, please correlate clinically.

 

IMPRESSION:

1. Simple appearing cyst at the lower pole of the left kidney without suspicious feature. Questionabl
e 2 mm nonobstructing calculus at the lower pole of the right kidney.

2. Indeterminate hypodensity in the pancreatic head measuring 7 mm. Recommend precautionary follow-up
 CT scan in 2-3 months for reassessment.

3. Markedly enlarged heterogeneous prostate, please correlate with PSA level. Other incidental findin
gs as detailed above.

## 2022-04-11 ENCOUNTER — HOSPITAL ENCOUNTER (OUTPATIENT)
Dept: HOSPITAL 47 - LABWHC1 | Age: 77
Discharge: HOME | End: 2022-04-11
Attending: INTERNAL MEDICINE
Payer: MEDICARE

## 2022-04-11 DIAGNOSIS — N39.0: ICD-10-CM

## 2022-04-11 DIAGNOSIS — N18.31: ICD-10-CM

## 2022-04-11 DIAGNOSIS — N40.0: ICD-10-CM

## 2022-04-11 DIAGNOSIS — E55.9: ICD-10-CM

## 2022-04-11 DIAGNOSIS — M10.9: ICD-10-CM

## 2022-04-11 DIAGNOSIS — D64.9: Primary | ICD-10-CM

## 2022-04-11 DIAGNOSIS — E21.3: ICD-10-CM

## 2022-04-11 LAB
ALBUMIN SERPL-MCNC: 4.2 G/DL (ref 3.8–4.9)
ALBUMIN/GLOB SERPL: 2.08 G/DL (ref 1.6–3.17)
ALP SERPL-CCNC: 113 U/L (ref 41–126)
ALT SERPL-CCNC: 19 U/L (ref 10–49)
ANION GAP SERPL CALC-SCNC: 12.7 MMOL/L (ref 10–18)
AST SERPL-CCNC: 15 U/L (ref 14–35)
BUN SERPL-SCNC: 21.1 MG/DL (ref 9–27)
BUN/CREAT SERPL: 17.44 RATIO (ref 12–20)
CALCIUM SPEC-MCNC: 10.8 MG/DL (ref 8.7–10.3)
CHLORIDE SERPL-SCNC: 102 MMOL/L (ref 96–109)
CO2 SERPL-SCNC: 27.3 MMOL/L (ref 20–27.5)
ERYTHROCYTE [DISTWIDTH] IN BLOOD BY AUTOMATED COUNT: 5.24 X 10*6/UL (ref 4.4–5.6)
ERYTHROCYTE [DISTWIDTH] IN BLOOD: 14.2 % (ref 11.5–14.5)
FERRITIN SERPL-MCNC: 197 NG/ML (ref 22–322)
GLOBULIN SER CALC-MCNC: 2 G/DL (ref 1.6–3.3)
GLUCOSE SERPL-MCNC: 95 MG/DL (ref 70–110)
HCT VFR BLD AUTO: 49.8 % (ref 39.6–50)
HGB BLD-MCNC: 15.5 G/DL (ref 13–17)
HYALINE CASTS UR QL AUTO: 1 /LPF (ref 0–2)
IRON SERPL-MCNC: 64 UG/DL (ref 65–175)
MAGNESIUM SPEC-SCNC: 2.4 MG/DL (ref 1.5–2.4)
MCH RBC QN AUTO: 29.6 PG (ref 27–32)
MCHC RBC AUTO-ENTMCNC: 31.1 G/DL (ref 32–37)
MCV RBC AUTO: 95 FL (ref 80–97)
NRBC BLD AUTO-RTO: 0 /100 WBCS (ref 0–0)
PH UR: 6.5 [PH] (ref 5–8)
PLATELET # BLD AUTO: 210 X 10*3/UL (ref 140–440)
POTASSIUM SERPL-SCNC: 5 MMOL/L (ref 3.5–5.5)
PROT SERPL-MCNC: 6.2 G/DL (ref 6.2–8.2)
PROT UR QL: (no result)
PSA SERPL-MCNC: 1.3 NG/ML (ref 0–6.5)
SODIUM SERPL-SCNC: 142 MMOL/L (ref 135–145)
SP GR UR: 1.01 (ref 1–1.03)
TIBC SERPL-MCNC: 342 UG/DL (ref 228–460)
URATE SERPL-MCNC: 6.6 MG/DL (ref 3.7–8.7)
UROBILINOGEN UR QL STRIP: <2 MG/DL (ref ?–2)
WBC # BLD AUTO: 10.03 X 10*3/UL (ref 4.5–10)
WBC #/AREA URNS HPF: 1 /HPF (ref 0–5)

## 2022-04-11 PROCEDURE — 82728 ASSAY OF FERRITIN: CPT

## 2022-04-11 PROCEDURE — 84153 ASSAY OF PSA TOTAL: CPT

## 2022-04-11 PROCEDURE — 83735 ASSAY OF MAGNESIUM: CPT

## 2022-04-11 PROCEDURE — 85027 COMPLETE CBC AUTOMATED: CPT

## 2022-04-11 PROCEDURE — 83540 ASSAY OF IRON: CPT

## 2022-04-11 PROCEDURE — 83550 IRON BINDING TEST: CPT

## 2022-04-11 PROCEDURE — 81001 URINALYSIS AUTO W/SCOPE: CPT

## 2022-04-11 PROCEDURE — 36415 COLL VENOUS BLD VENIPUNCTURE: CPT

## 2022-04-11 PROCEDURE — 83970 ASSAY OF PARATHORMONE: CPT

## 2022-04-11 PROCEDURE — 84100 ASSAY OF PHOSPHORUS: CPT

## 2022-04-11 PROCEDURE — 82306 VITAMIN D 25 HYDROXY: CPT

## 2022-04-11 PROCEDURE — 84550 ASSAY OF BLOOD/URIC ACID: CPT

## 2022-04-11 PROCEDURE — 80053 COMPREHEN METABOLIC PANEL: CPT

## 2022-04-19 ENCOUNTER — HOSPITAL ENCOUNTER (OUTPATIENT)
Dept: HOSPITAL 47 - RADXRMAIN | Age: 77
Discharge: HOME | End: 2022-04-19
Attending: INTERNAL MEDICINE
Payer: MEDICARE

## 2022-04-19 DIAGNOSIS — J44.9: Primary | ICD-10-CM

## 2022-04-19 PROCEDURE — 71046 X-RAY EXAM CHEST 2 VIEWS: CPT

## 2022-04-19 NOTE — XR
EXAMINATION TYPE: XR chest 2V

 

DATE OF EXAM: 4/19/2022

 

COMPARISON: 12/28/2021

 

TECHNIQUE: PA and lateral views submitted.

 

HISTORY: Hemoptysis

 

FINDINGS:

Cardiac device is seen is a coarsened interstitium with subsegmental changes at the left lung base. S
urgical change overlying the lower heart. Biapical pleural thickening. Hypertrophic and degenerative 
change of the spine. Suspect COPD. Prominence of the right hilum and suprahilar region.

 

IMPRESSION:

1. COPD correlate for chronic interstitial lung disease. Prominence of the right hilum and suprahilar
 region. Recommend CT scan exclude mass or adenopathy

2. Basilar atelectasis favored over pneumonia.

## 2022-04-29 ENCOUNTER — HOSPITAL ENCOUNTER (OUTPATIENT)
Dept: HOSPITAL 47 - RADNMMAIN | Age: 77
Discharge: HOME | End: 2022-04-29
Attending: INTERNAL MEDICINE
Payer: MEDICARE

## 2022-04-29 DIAGNOSIS — E83.52: Primary | ICD-10-CM

## 2022-04-29 PROCEDURE — 78071 PARATHYRD PLANAR W/WO SUBTRJ: CPT

## 2022-04-29 NOTE — NM
EXAMINATION TYPE: NM parathyroid w/spect

 

DATE OF EXAM: 4/29/2022

 

 COMPARISON: NONE

 

HISTORY: Hypercalcemia and kidney stones, E 83.52 

 

TECHNIQUE:

Following administration of 26.1 mCi Tc99m Sestamibi. Anterior projection images of the neck and ches
t were obtained 10 minutes and 3 hours post injection.  SPECT images of the neck and chest were obtai
esme and reconstructed in three axes.

 

FINDINGS:

 

Thyroid tracer washout: Delayed images demonstrate near-complete tracer washout from the thyroid.

 

Parathyroid uptake: None. The two-hour delayed images do not demonstrate any focal abnormal persisten
t uptake in the region of the parathyroid glands to suggest parathyroid adenoma.

 

Normal uptake: There is physiological tracer uptake in the salivary glands, and thyroid gland.

 

IMPRESSION:

Normal parathyroid imaging study. No evidence for mediastinal uptake to suggest mediastinal parathyro
id adenoma

## 2022-06-09 ENCOUNTER — HOSPITAL ENCOUNTER (OUTPATIENT)
Dept: HOSPITAL 47 - RADCTMAIN | Age: 77
Discharge: HOME | End: 2022-06-09
Attending: FAMILY MEDICINE
Payer: MEDICARE

## 2022-06-09 DIAGNOSIS — R22.2: Primary | ICD-10-CM

## 2022-06-09 DIAGNOSIS — R79.81: ICD-10-CM

## 2022-06-09 LAB — BUN SERPL-SCNC: 19 MG/DL (ref 9–20)

## 2022-06-09 PROCEDURE — 82565 ASSAY OF CREATININE: CPT

## 2022-06-09 PROCEDURE — 84520 ASSAY OF UREA NITROGEN: CPT

## 2022-06-09 PROCEDURE — 71275 CT ANGIOGRAPHY CHEST: CPT

## 2022-06-09 PROCEDURE — 36415 COLL VENOUS BLD VENIPUNCTURE: CPT

## 2022-06-09 NOTE — CT
EXAMINATION TYPE: CT angio chest w con, with no 3-D reconstructions

 

DATE OF EXAM: 6/9/2022

 

COMPARISON: NONE

 

HISTORY: ABNORMAL BLOOD-GAS LEVEL. SOB and cough up blood

 

CT DLP: 619.30 mGycm. Automated Exposure Control for Dose Reduction was Utilized.

 

CONTRAST: CTA scan of the thorax is performed with IV Contrast, patient injected with 90 mL of Isovue
 370.  MIP Images are created on CT scanner and reviewed. 3D reconstructed images are created on an [employer] workstation and reviewed.

 

FINDINGS:

 

AIRWAYS: Unremarkable.

 

PLEURAL SPACES: Negative.

 

LUNGS AND MEDIASTINUM: There is a 6 x 5 x 5 cm right paramidline posterior mediastinal mass with ill-
defined margins, associated with 2 cm right anterolateral direct invasion of the T2 vertebral body (n
o intraspinal component). This mass abuts and anteriorly displaces the trachea and the esophagus abov
e the level of the lyla. The lesion's right lateral margin is ill-defined as it directly invades th
e right lung parenchyma.

 

Remainder of the lung shows multifocal bleb formation and scattered nonspecific predominantly linear 
ill-defined added opacities.

 

There is satisfactory enhancement of the pulmonary artery and its branches; no CT evidence for pulmon
amanda embolism. No acute aortic findings. No cardiomegaly. Coronary calcifications are noted. No perica
rdial effusion.

 

OTHER:  No additional significant abnormality is seen.

 

IMPRESSION: 

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

## 2022-06-17 ENCOUNTER — HOSPITAL ENCOUNTER (OUTPATIENT)
Dept: HOSPITAL 47 - ORWHC2ENDO | Age: 77
Discharge: HOME | End: 2022-06-17
Attending: INTERNAL MEDICINE
Payer: MEDICARE

## 2022-06-17 VITALS — HEART RATE: 68 BPM | DIASTOLIC BLOOD PRESSURE: 75 MMHG | RESPIRATION RATE: 17 BRPM | SYSTOLIC BLOOD PRESSURE: 145 MMHG

## 2022-06-17 VITALS — BODY MASS INDEX: 29.9 KG/M2

## 2022-06-17 VITALS — TEMPERATURE: 98.8 F

## 2022-06-17 DIAGNOSIS — N40.0: ICD-10-CM

## 2022-06-17 DIAGNOSIS — K57.30: ICD-10-CM

## 2022-06-17 DIAGNOSIS — K29.50: ICD-10-CM

## 2022-06-17 DIAGNOSIS — Z95.2: ICD-10-CM

## 2022-06-17 DIAGNOSIS — Z87.891: ICD-10-CM

## 2022-06-17 DIAGNOSIS — D86.0: ICD-10-CM

## 2022-06-17 DIAGNOSIS — I10: ICD-10-CM

## 2022-06-17 DIAGNOSIS — I35.0: ICD-10-CM

## 2022-06-17 DIAGNOSIS — G47.33: ICD-10-CM

## 2022-06-17 DIAGNOSIS — Z79.01: ICD-10-CM

## 2022-06-17 DIAGNOSIS — E78.5: ICD-10-CM

## 2022-06-17 DIAGNOSIS — D12.4: Primary | ICD-10-CM

## 2022-06-17 DIAGNOSIS — K21.00: ICD-10-CM

## 2022-06-17 DIAGNOSIS — Z95.0: ICD-10-CM

## 2022-06-17 DIAGNOSIS — Z87.442: ICD-10-CM

## 2022-06-17 DIAGNOSIS — K22.70: ICD-10-CM

## 2022-06-17 DIAGNOSIS — Z79.899: ICD-10-CM

## 2022-06-17 PROCEDURE — 88305 TISSUE EXAM BY PATHOLOGIST: CPT

## 2022-06-17 PROCEDURE — 45385 COLONOSCOPY W/LESION REMOVAL: CPT

## 2022-06-17 PROCEDURE — 43239 EGD BIOPSY SINGLE/MULTIPLE: CPT

## 2022-06-17 NOTE — P.PCN
Date of Procedure: 06/17/22


Procedure(s) Performed: 


Brief history:


Patient is a pleasant 76-year-old white male scheduled for an elective upper 

endoscopy as well as colonoscopy as a part of evaluation of GERD/screening for 

colorectal neoplasia





Procedure performed:


Esophagogastroduodenoscopy with biopsy


Colonoscopy with snare polypectomy





Preoperative diagnosis:


GERD


Screening for colon cancer





Anesthesia: MAC





Procedure:


After informed consent was obtained from the patient  was brought into the 

endoscopy unit and IV  sedation was administered by anesthesia under continuous 

monitoring.  Initially upper endoscopy was done.  The Olympus  video 

endoscope was inserted inserted into the mouth and esophagus intubated without a

ny difficulty and was gradually advanced into the stomach and duodenum and 

carefully examined.  The bulb and second part of the duodenum appeared normal.  

The scope was then withdrawn into the stomach adequately insufflated with air 

and upon careful examination  the antrum had linear EUS of erythema consistent 

with gastritis.  Biopsies were done from this area.  The body, cardia and fundus

appeared normal.  The scope was then withdrawn into the esophagus.  The GE 

junction was located at 45 cm to the incisors.  It appeared regular with no 

erythema erosions or ulcerations.  There was a short tongue of Ramesh's 

appearing mucosa just proximal to the GE junction which was biopsied.  Rest of 

the esophagus appeared normal.  Patient tolerated the procedure well.





At this time the patient continued to remain sedation.  Initial digital rectal 

examination was normal.  Olympus  video colonoscope was then inserted into

the rectum and gradually advanced to the cecum without any difficulty.  Careful 

examination was performed as the scope was gradually being withdrawn.  The prep 

was excellent.  The cecum, ascending colon, appeared normal.  In the transverse 

colon there was a 1 cm flat polyp removed by snare polypectomy.  In the 

descending colon there was a 1 cm polyp removed by snare polypectomy.  Scattered

sigmoid diverticulosis seen.  Rest of the transverse colon, descending colon, si

gmoid colon and rectum appeared normal.  Retroflexion was performed in the 

rectum and no lesions were noted.  Patient tolerated the procedure well.





Impression:


1.  Upper endoscopy revealed short segment Ramesh's esophagus and mild antral 

gastritis


2.  Colonoscopy revealed scattered sigmoid diverticulosis, 1 cm flat transverse 

colon polyp status post polypectomy and 1 cm descending colon polyp status post 

polypectomy,.








Recommendations:


Findings of this examination were discussed with the patient as well as family. 

He was advised to follow with the biopsy results.If the biopsy reveals adenoma 

he can have a repeat colonoscopy in 3 years.

## 2022-07-05 ENCOUNTER — HOSPITAL ENCOUNTER (OUTPATIENT)
Dept: HOSPITAL 47 - LABWHC1 | Age: 77
Discharge: HOME | End: 2022-07-05
Attending: INTERNAL MEDICINE
Payer: MEDICARE

## 2022-07-05 DIAGNOSIS — D64.9: Primary | ICD-10-CM

## 2022-07-05 DIAGNOSIS — N39.0: ICD-10-CM

## 2022-07-05 DIAGNOSIS — N18.31: ICD-10-CM

## 2022-07-05 DIAGNOSIS — E55.9: ICD-10-CM

## 2022-07-05 DIAGNOSIS — N25.81: ICD-10-CM

## 2022-07-05 DIAGNOSIS — R80.9: ICD-10-CM

## 2022-07-05 DIAGNOSIS — M10.9: ICD-10-CM

## 2022-07-05 LAB
ALBUMIN SERPL-MCNC: 3.3 G/DL (ref 3.8–4.9)
ALBUMIN/GLOB SERPL: 1.32 G/DL (ref 1.6–3.17)
ALP SERPL-CCNC: 92 U/L (ref 41–126)
ALT SERPL-CCNC: 13 U/L (ref 10–49)
ANION GAP SERPL CALC-SCNC: 10.1 MMOL/L (ref 10–18)
AST SERPL-CCNC: 12 U/L (ref 14–35)
BASOPHILS # BLD AUTO: 0.08 X 10*3/UL (ref 0–0.1)
BASOPHILS NFR BLD AUTO: 0.5 %
BUN SERPL-SCNC: 20.8 MG/DL (ref 9–27)
BUN/CREAT SERPL: 16 RATIO (ref 12–20)
CALCIUM SPEC-MCNC: 10.4 MG/DL (ref 8.7–10.3)
CHLORIDE SERPL-SCNC: 96 MMOL/L (ref 96–109)
CO2 SERPL-SCNC: 27.9 MMOL/L (ref 20–27.5)
EOSINOPHIL # BLD AUTO: 0.12 X 10*3/UL (ref 0.04–0.35)
EOSINOPHIL NFR BLD AUTO: 0.8 %
ERYTHROCYTE [DISTWIDTH] IN BLOOD BY AUTOMATED COUNT: 4.08 X 10*6/UL (ref 4.4–5.6)
ERYTHROCYTE [DISTWIDTH] IN BLOOD: 15.3 % (ref 11.5–14.5)
FERRITIN SERPL-MCNC: 585 NG/ML (ref 22–322)
GLOBULIN SER CALC-MCNC: 2.5 G/DL (ref 1.6–3.3)
GLUCOSE SERPL-MCNC: 142 MG/DL (ref 70–110)
HCT VFR BLD AUTO: 38 % (ref 39.6–50)
HGB BLD-MCNC: 11.7 G/DL (ref 13–17)
IMM GRANULOCYTES BLD QL AUTO: 0.7 %
IRON SERPL-MCNC: 34 UG/DL (ref 65–175)
LYMPHOCYTES # SPEC AUTO: 0.67 X 10*3/UL (ref 0.9–5)
LYMPHOCYTES NFR SPEC AUTO: 4.3 %
MAGNESIUM SPEC-SCNC: 2 MG/DL (ref 1.5–2.4)
MCH RBC QN AUTO: 28.7 PG (ref 27–32)
MCHC RBC AUTO-ENTMCNC: 30.8 G/DL (ref 32–37)
MCV RBC AUTO: 93.1 FL (ref 80–97)
MONOCYTES # BLD AUTO: 0.98 X 10*3/UL (ref 0.2–1)
MONOCYTES NFR BLD AUTO: 6.2 %
NEUTROPHILS # BLD AUTO: 13.75 X 10*3/UL (ref 1.8–7.7)
NEUTROPHILS NFR BLD AUTO: 87.5 %
NRBC BLD AUTO-RTO: 0 /100 WBCS (ref 0–0)
PH UR: 6.5 [PH] (ref 5–8)
PLATELET # BLD AUTO: 251 X 10*3/UL (ref 140–440)
POTASSIUM SERPL-SCNC: 4.5 MMOL/L (ref 3.5–5.5)
PROT SERPL-MCNC: 5.8 G/DL (ref 6.2–8.2)
SODIUM SERPL-SCNC: 134 MMOL/L (ref 135–145)
SP GR UR: 1 (ref 1–1.03)
TIBC SERPL-MCNC: 221 UG/DL (ref 228–460)
URATE SERPL-MCNC: 7.3 MG/DL (ref 3.7–8.7)
UROBILINOGEN UR QL STRIP: <2 MG/DL (ref ?–2)
WBC # BLD AUTO: 15.71 X 10*3/UL (ref 4.5–10)

## 2022-07-05 PROCEDURE — 82570 ASSAY OF URINE CREATININE: CPT

## 2022-07-05 PROCEDURE — 83550 IRON BINDING TEST: CPT

## 2022-07-05 PROCEDURE — 82306 VITAMIN D 25 HYDROXY: CPT

## 2022-07-05 PROCEDURE — 83735 ASSAY OF MAGNESIUM: CPT

## 2022-07-05 PROCEDURE — 81003 URINALYSIS AUTO W/O SCOPE: CPT

## 2022-07-05 PROCEDURE — 84550 ASSAY OF BLOOD/URIC ACID: CPT

## 2022-07-05 PROCEDURE — 80053 COMPREHEN METABOLIC PANEL: CPT

## 2022-07-05 PROCEDURE — 85025 COMPLETE CBC W/AUTO DIFF WBC: CPT

## 2022-07-05 PROCEDURE — 83970 ASSAY OF PARATHORMONE: CPT

## 2022-07-05 PROCEDURE — 83540 ASSAY OF IRON: CPT

## 2022-07-05 PROCEDURE — 82728 ASSAY OF FERRITIN: CPT

## 2022-07-05 PROCEDURE — 82043 UR ALBUMIN QUANTITATIVE: CPT

## 2022-07-05 PROCEDURE — 84100 ASSAY OF PHOSPHORUS: CPT

## 2022-07-05 PROCEDURE — 36415 COLL VENOUS BLD VENIPUNCTURE: CPT

## 2022-07-08 ENCOUNTER — HOSPITAL ENCOUNTER (OUTPATIENT)
Dept: HOSPITAL 47 - RADXRMAIN | Age: 77
Discharge: HOME | End: 2022-07-08
Attending: INTERNAL MEDICINE
Payer: MEDICARE

## 2022-07-08 DIAGNOSIS — R93.7: ICD-10-CM

## 2022-07-08 DIAGNOSIS — R91.8: Primary | ICD-10-CM

## 2022-07-08 PROCEDURE — 78815 PET IMAGE W/CT SKULL-THIGH: CPT

## 2022-07-11 NOTE — PE
EXAMINATION TYPE: PET CT fusion skull to thigh

 

DATE OF EXAM: 7/8/2022

 

COMPARISON: Most recent CTA chest June 9, 2022 and older studies

 

HISTORY: Solitary pulmonary nodule, abnormal CT   

 

TECHNIQUE:  Following the intravenous administration of 13.1 mCi of F-18 FDG, whole body images are p
erformed from the skull base to the midthigh.  Images are reviewed on the computer in the coronal, ax
ial, and sagittal planes.  Reconstructed rotating images are created on independent workstation and r
eviewed on the computer.   A localization and attenuation correction CT is performed in conjunction w
ith the PET scan. Blood glucose level is 98.

 

SCAN: Initial Scan

 

FINDINGS: Slightly suboptimal secondary to patient's large body habitus.

 

SKULL BASE AND NECK:  Mild uptake posterior hypopharynx at the level of the false vocal cords is nons
pecific, max SUV is 4.03 on axial image 58 series 3. 

 

No additional abnormal hypermetabolic adenopathy.

 

CHEST, MEDIASTINUM, AND HILAR REGION: Background moderate underlying emphysematous change is redemons
trated. Persistent 6.1 x 4.5 cm superior mediastinal mass causing anterior tracheal displacement inva
ding the anterior mid thoracic vertebra has abnormal hypermetabolic uptake, max SUV is 15.72 on axial
 image 83.

 

No additional areas of abnormal hypermetabolic uptake in the thorax.

 

ABDOMEN AND PELVIS: No adrenal masses are seen. No areas of abnormal hypermetabolic uptake. Normal ex
cretion.

 

OSSEOUS STRUCTURES: No additional areas of abnormal hypermetabolic uptake.

 

OTHER CT: Heterogeneous slightly prominent thyroid gland redemonstrated. Persistent cardiomegaly with
 multi lead pacemaker. Persistent stent graft at the aortic root along with coronary artery calcifica
tions. There is small to tiny right pleural effusion on current study. Persistent focal groundglass o
pacity and organizing consolidation in the medial right upper lobe. Correlate for acute infectious pr
ocess. Prominent pulmonary arteries consistent with underlying pulmonary hypertension redemonstrated.


 

Cholecystectomy clips redemonstrated. Osseous structures are demineralized.

 

IMPRESSION: Abnormal hypermetabolic uptake in the destructive mediastinal mass consistent with neopla
sm. Nonspecific subcentimeter hypermetabolic focus near level of the larynx. Consider direct visualiz
ation to exclude neoplasm. No additional areas of abnormal hypermetabolic uptake identified.

## 2022-07-20 ENCOUNTER — HOSPITAL ENCOUNTER (INPATIENT)
Dept: HOSPITAL 47 - EC | Age: 77
LOS: 5 days | Discharge: HOSPICE | DRG: 181 | End: 2022-07-25
Payer: MEDICARE

## 2022-07-20 DIAGNOSIS — M84.48XA: ICD-10-CM

## 2022-07-20 DIAGNOSIS — E87.1: ICD-10-CM

## 2022-07-20 DIAGNOSIS — H91.90: ICD-10-CM

## 2022-07-20 DIAGNOSIS — E78.5: ICD-10-CM

## 2022-07-20 DIAGNOSIS — G89.29: ICD-10-CM

## 2022-07-20 DIAGNOSIS — N17.9: ICD-10-CM

## 2022-07-20 DIAGNOSIS — I12.9: ICD-10-CM

## 2022-07-20 DIAGNOSIS — N18.30: ICD-10-CM

## 2022-07-20 DIAGNOSIS — Z20.822: ICD-10-CM

## 2022-07-20 DIAGNOSIS — N40.0: ICD-10-CM

## 2022-07-20 DIAGNOSIS — C38.3: Primary | ICD-10-CM

## 2022-07-20 DIAGNOSIS — E86.1: ICD-10-CM

## 2022-07-20 DIAGNOSIS — D86.9: ICD-10-CM

## 2022-07-20 DIAGNOSIS — J44.0: ICD-10-CM

## 2022-07-20 LAB
ALBUMIN SERPL-MCNC: 2.7 G/DL (ref 3.5–5)
ALP SERPL-CCNC: 101 U/L (ref 38–126)
ALT SERPL-CCNC: 11 U/L (ref 4–49)
ANION GAP SERPL CALC-SCNC: 6 MMOL/L
APTT BLD: 30.2 SEC (ref 22–30)
AST SERPL-CCNC: 16 U/L (ref 17–59)
BASOPHILS # BLD AUTO: 0 K/UL (ref 0–0.2)
BASOPHILS NFR BLD AUTO: 0 %
BUN SERPL-SCNC: 35 MG/DL (ref 9–20)
CALCIUM SPEC-MCNC: 10 MG/DL (ref 8.4–10.2)
CHLORIDE SERPL-SCNC: 92 MMOL/L (ref 98–107)
CO2 SERPL-SCNC: 33 MMOL/L (ref 22–30)
EOSINOPHIL # BLD AUTO: 0 K/UL (ref 0–0.7)
EOSINOPHIL NFR BLD AUTO: 0 %
ERYTHROCYTE [DISTWIDTH] IN BLOOD BY AUTOMATED COUNT: 4.07 M/UL (ref 4.3–5.9)
ERYTHROCYTE [DISTWIDTH] IN BLOOD: 15 % (ref 11.5–15.5)
GLUCOSE SERPL-MCNC: 132 MG/DL (ref 74–99)
HCT VFR BLD AUTO: 37.2 % (ref 39–53)
HGB BLD-MCNC: 12 GM/DL (ref 13–17.5)
HYALINE CASTS UR QL AUTO: 1 /LPF (ref 0–2)
INR PPP: 1.1 (ref ?–1.2)
LYMPHOCYTES # SPEC AUTO: 0.5 K/UL (ref 1–4.8)
LYMPHOCYTES NFR SPEC AUTO: 2 %
MAGNESIUM SPEC-SCNC: 1.8 MG/DL (ref 1.6–2.3)
MCH RBC QN AUTO: 29.6 PG (ref 25–35)
MCHC RBC AUTO-ENTMCNC: 32.4 G/DL (ref 31–37)
MCV RBC AUTO: 91.3 FL (ref 80–100)
MONOCYTES # BLD AUTO: 0.7 K/UL (ref 0–1)
MONOCYTES NFR BLD AUTO: 3 %
NEUTROPHILS # BLD AUTO: 21.5 K/UL (ref 1.3–7.7)
NEUTROPHILS NFR BLD AUTO: 94 %
PH UR: 5 [PH] (ref 5–8)
PLATELET # BLD AUTO: 268 K/UL (ref 150–450)
POTASSIUM SERPL-SCNC: 4.1 MMOL/L (ref 3.5–5.1)
PROT SERPL-MCNC: 4.9 G/DL (ref 6.3–8.2)
PT BLD: 12.1 SEC (ref 9–12)
RBC UR QL: 12 /HPF (ref 0–5)
SODIUM SERPL-SCNC: 131 MMOL/L (ref 137–145)
SP GR UR: 1.02 (ref 1–1.03)
UROBILINOGEN UR QL STRIP: 2 MG/DL (ref ?–2)
WBC # BLD AUTO: 23 K/UL (ref 3.8–10.6)
WBC # UR AUTO: 1 /HPF (ref 0–5)

## 2022-07-20 PROCEDURE — 31629 BRONCHOSCOPY/NEEDLE BX EACH: CPT

## 2022-07-20 PROCEDURE — 84484 ASSAY OF TROPONIN QUANT: CPT

## 2022-07-20 PROCEDURE — 88342 IMHCHEM/IMCYTCHM 1ST ANTB: CPT

## 2022-07-20 PROCEDURE — 36415 COLL VENOUS BLD VENIPUNCTURE: CPT

## 2022-07-20 PROCEDURE — 85610 PROTHROMBIN TIME: CPT

## 2022-07-20 PROCEDURE — 96375 TX/PRO/DX INJ NEW DRUG ADDON: CPT

## 2022-07-20 PROCEDURE — 87040 BLOOD CULTURE FOR BACTERIA: CPT

## 2022-07-20 PROCEDURE — 72128 CT CHEST SPINE W/O DYE: CPT

## 2022-07-20 PROCEDURE — 83605 ASSAY OF LACTIC ACID: CPT

## 2022-07-20 PROCEDURE — 88341 IMHCHEM/IMCYTCHM EA ADD ANTB: CPT

## 2022-07-20 PROCEDURE — 81001 URINALYSIS AUTO W/SCOPE: CPT

## 2022-07-20 PROCEDURE — 96368 THER/DIAG CONCURRENT INF: CPT

## 2022-07-20 PROCEDURE — 85025 COMPLETE CBC W/AUTO DIFF WBC: CPT

## 2022-07-20 PROCEDURE — 94660 CPAP INITIATION&MGMT: CPT

## 2022-07-20 PROCEDURE — 80048 BASIC METABOLIC PNL TOTAL CA: CPT

## 2022-07-20 PROCEDURE — 87205 SMEAR GRAM STAIN: CPT

## 2022-07-20 PROCEDURE — 94640 AIRWAY INHALATION TREATMENT: CPT

## 2022-07-20 PROCEDURE — 96365 THER/PROPH/DIAG IV INF INIT: CPT

## 2022-07-20 PROCEDURE — 83880 ASSAY OF NATRIURETIC PEPTIDE: CPT

## 2022-07-20 PROCEDURE — 70450 CT HEAD/BRAIN W/O DYE: CPT

## 2022-07-20 PROCEDURE — 88305 TISSUE EXAM BY PATHOLOGIST: CPT

## 2022-07-20 PROCEDURE — 31652 BRONCH EBUS SAMPLNG 1/2 NODE: CPT

## 2022-07-20 PROCEDURE — 94760 N-INVAS EAR/PLS OXIMETRY 1: CPT

## 2022-07-20 PROCEDURE — 85730 THROMBOPLASTIN TIME PARTIAL: CPT

## 2022-07-20 PROCEDURE — 72125 CT NECK SPINE W/O DYE: CPT

## 2022-07-20 PROCEDURE — 83735 ASSAY OF MAGNESIUM: CPT

## 2022-07-20 PROCEDURE — 71046 X-RAY EXAM CHEST 2 VIEWS: CPT

## 2022-07-20 PROCEDURE — 51702 INSERT TEMP BLADDER CATH: CPT

## 2022-07-20 PROCEDURE — 87070 CULTURE OTHR SPECIMN AEROBIC: CPT

## 2022-07-20 PROCEDURE — 93005 ELECTROCARDIOGRAM TRACING: CPT

## 2022-07-20 PROCEDURE — 96366 THER/PROPH/DIAG IV INF ADDON: CPT

## 2022-07-20 PROCEDURE — 99285 EMERGENCY DEPT VISIT HI MDM: CPT

## 2022-07-20 PROCEDURE — 80053 COMPREHEN METABOLIC PANEL: CPT

## 2022-07-20 PROCEDURE — 88173 CYTOPATH EVAL FNA REPORT: CPT

## 2022-07-20 PROCEDURE — 84145 PROCALCITONIN (PCT): CPT

## 2022-07-20 PROCEDURE — 87635 SARS-COV-2 COVID-19 AMP PRB: CPT

## 2022-07-20 RX ADMIN — OXYCODONE HYDROCHLORIDE AND ACETAMINOPHEN PRN EACH: 10; 325 TABLET ORAL at 20:42

## 2022-07-20 NOTE — ED
General Adult HPI





- General


Chief complaint: Weakness


Stated complaint: SOB, Weakness


Time Seen by Provider: 07/20/22 13:27


Source: patient, EMS, RN notes reviewed, old records reviewed


Mode of arrival: EMS


Limitations: physical limitation





- History of Present Illness


Initial comments: 





This is a 76-year-old male who presents emergency Department stating he was 

recently diagnosed with lung cancer and he is going to be having a bronchoscopy 

tomorrow to have further evaluated.  Patient comes in today because over the 

last few weeks become weaker and weaker and today he was unable to stand or get 

out of bed.  Patient states he also has chronic pain he said his chest and his 

back and he is been placed on a fentanyl patch and OxyContin for that.  Patient 

also states she's been coughing up blood for 4 months and they believe is 

related to the cancer and that is why he is scheduled bronchoscopy tomorrow with

Dr. Padilla.  Patient's only new complaint is that he is too weak to get around 

orders ambulate.  Patient states she cannot go back home at this because his 

wife is unable to take care of him.  Patient denies any fever chills or cough 

per patient denies any new chest pain difficulty breathing shortness of breath. 

Patient denies any abdominal pain patient denies nausea vomiting diarrhea.





- Related Data


                                Home Medications











 Medication  Instructions  Recorded  Confirmed


 


Budesonide-Formot 160-4.5 Mcg 2 puff INHALATION RT-BID 01/02/18 07/20/22





[Symbicort 160-4.5 Mcg Inhaler]   


 


amLODIPine BESYLATE 5 mg PO DAILY 02/28/19 07/20/22


 


Turmeric Root Extract [Turmeric] 500 mg PO DAILY 03/17/20 07/20/22


 


Apixaban [Eliquis] 5 mg PO BID 05/23/22 07/20/22


 


Atorvastatin Calcium [Lipitor] 40 mg PO HS 05/23/22 07/20/22


 


Metoprolol Succinate [Toprol XL] 50 mg PO DAILY 05/23/22 07/20/22


 


Omeprazole 20 mg PO BID 05/23/22 07/20/22


 


Tart Cherry Concentrate 1 dose PO DAILY PRN 05/23/22 07/20/22


 


Torsemide [Demadex] 20 mg PO DAILY 05/23/22 07/20/22


 


Albuterol Inhaler [Ventolin Hfa 1 - 2 puff INHALATION RT-Q6H PRN 07/19/22 07/20/22





Inhaler]   


 


Lactulose 10 gm PO DAILY 07/19/22 07/20/22


 


fentaNYL [Duragesic 37.5 MCG/HR] 1 patch TRANSDERM Q72H 07/19/22 07/20/22


 


oxyCODONE-APAP 10-325MG [Percocet 1 tab PO Q6HR 07/19/22 07/20/22





 mg]   


 


Acetaminophen Tab [Tylenol Tab] 1,000 mg PO Q6H PRN 07/20/22 07/20/22








                                  Previous Rx's











 Medication  Instructions  Recorded


 


Losartan Potassium [Cozaar] 50 mg PO DAILY 30 Days #15 tab 05/24/22











                                    Allergies











Allergy/AdvReac Type Severity Reaction Status Date / Time


 


amoxicillin trihydrate Allergy  Rash/Hives Verified 07/20/22 17:30





[From Augmentin]     


 


clindamycin Allergy  Rash/Hives Verified 07/20/22 17:30


 


ibuprofen Allergy  Rash/Hives Verified 07/20/22 17:30


 


potassium clavulanate Allergy  Rash/Hives Verified 07/20/22 17:30





[From Augmentin]     


 


ragweed pollen AdvReac  SINUS Verified 07/20/22 17:30





   PROBLEMS  














Review of Systems


ROS Statement: 


Those systems with pertinent positive or pertinent negative responses have been 

documented in the HPI.





ROS Other: All systems not noted in ROS Statement are negative.





Past Medical History


Past Medical History: Asthma, Cancer, Chest Pain / Angina, GERD/Reflux, Hearing 

Disorder / Deafness, Hyperlipidemia, Hypertension, Osteoarthritis (OA), Prostate

Disorder, Renal Disease, Respiratory Disorder


Additional Past Medical History / Comment(s): SKIN CA LT ARM.  HEART MURMUR, hx 

AORTIC STENOSIS.  Varicose Veins. Hx Sarcoidosis, back pain, tumor in lung, 

hiatal hernia, constipation, 'spot on pancreas", enlarged prostate, "stage III 

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


History of Any Multi-Drug Resistant Organisms: MRSA


Date of last positivie culture/infection: 2010


MDRO Source:: sinus


Past Surgical History: Cardiac Valve Replacement, Cholecystectomy, Heart 

Catheterization, Pacemaker, Prostate Surgery


Additional Past Surgical History / Comment(s): Septoplasty.  Colonoscopy.  NECK 

GLAND BIOPSY.  BRONCH, LUNG biopsy. TVAR aortic valve replacement 2020, skin 

cancer removed left arm,


Past Anesthesia/Blood Transfusion Reactions: No Reported Reaction


Type of Cardiac Device: Permanent Pacemaker


Device Placement Date:: 2020 [employer]


Past Psychological History: No Psychological Hx Reported


Smoking Status: Former smoker


Past Alcohol Use History: None Reported


Past Drug Use History: None Reported





- Past Family History


  ** Sister(s)


Family Medical History: Deep Vein Thrombosis (DVT)


Additional Family Medical History / Comment(s): .





  ** Mother


Family Medical History: Cancer


Additional Family Medical History / Comment(s): BRAIN CA, COLON CA





General Exam





- General Exam Comments


Initial Comments: 





GENERAL:


Patient is well-developed and well-nourished.  Patient is nontoxic and well-

hydrated and is in mild distress.





ENT:


Neck is soft and supple.  No significant lymphadenopathy is noted.  Oropharynx 

is clear.  Moist mucous membranes.  Neck has full range of motion without 

eliciting any pain. 





EYES:


The sclera were anicteric and conjunctiva were pink and moist.  Extraocular 

movements were intact and pupils were equal round and reactive to light.  

Eyelids were unremarkable.





PULMONARY:


Unlabored respirations.  Good breath sounds bilaterally.  No audible rales 

rhonchi or wheezing was noted.





CARDIOVASCULAR:


There is a regular rate and rhythm without any murmurs gallops or rubs.  





ABDOMEN:


Soft and nontender with normal bowel sounds. 





SKIN:


Skin is clear with no lesions or rashes and otherwise unremarkable.





NEUROLOGIC:


Patient is alert and oriented x3.  Cranial nerves II through XII are grossly 

intact.  Motor and sensory are also intact.  Normal speech, volume and content. 

Symmetrical smile. 





MUSCULOSKELETAL:


Patient's able to move all 4 extremities however his legs appear to be weak but 

equally weak no focal deficit noted.  Patient has 2+ edema bilaterally. 





LYMPHATICS:


No significant lymphadenopathy is noted





PSYCHIATRIC:


Normal psychiatric evaluation.  


Limitations: physical limitation





Course


                                   Vital Signs











  07/20/22 07/20/22





  12:46 15:43


 


Temperature 97.2 F L 97.7 F


 


Pulse Rate 73 68


 


Respiratory 16 16





Rate  


 


Blood Pressure 97/55 115/50


 


O2 Sat by Pulse 95 98





Oximetry  














Medical Decision Making





- Medical Decision Making





EKG shows paced rhythm at 79 bpm QRS is 100 a 70 Q-T intervals 43 QTC is 518.





Multilobar pneumonia





I saw the patient Rocephin and Zithromax.





I spoke with [facility] agreed to admit the patient admitted the

patient wrote admitting orders.





- Lab Data


Result diagrams: 


                                 07/20/22 14:07





                                 07/20/22 14:07


                                   Lab Results











  07/20/22 07/20/22 07/20/22 Range/Units





  14:07 14:07 14:07 


 


WBC  23.0 H    (3.8-10.6)  k/uL


 


RBC  4.07 L    (4.30-5.90)  m/uL


 


Hgb  12.0 L    (13.0-17.5)  gm/dL


 


Hct  37.2 L    (39.0-53.0)  %


 


MCV  91.3    (80.0-100.0)  fL


 


MCH  29.6    (25.0-35.0)  pg


 


MCHC  32.4    (31.0-37.0)  g/dL


 


RDW  15.0    (11.5-15.5)  %


 


Plt Count  268    (150-450)  k/uL


 


MPV  7.2    


 


Neutrophils %  94    %


 


Lymphocytes %  2    %


 


Monocytes %  3    %


 


Eosinophils %  0    %


 


Basophils %  0    %


 


Neutrophils #  21.5 H    (1.3-7.7)  k/uL


 


Lymphocytes #  0.5 L    (1.0-4.8)  k/uL


 


Monocytes #  0.7    (0-1.0)  k/uL


 


Eosinophils #  0.0    (0-0.7)  k/uL


 


Basophils #  0.0    (0-0.2)  k/uL


 


Hypochromasia  Slight    


 


Poikilocytosis  Slight    


 


PT   12.1 H   (9.0-12.0)  sec


 


INR   1.1   (<1.2)  


 


APTT   30.2 H   (22.0-30.0)  sec


 


Sodium     (137-145)  mmol/L


 


Potassium     (3.5-5.1)  mmol/L


 


Chloride     ()  mmol/L


 


Carbon Dioxide     (22-30)  mmol/L


 


Anion Gap     mmol/L


 


BUN     (9-20)  mg/dL


 


Creatinine     (0.66-1.25)  mg/dL


 


Est GFR (CKD-EPI)AfAm     (>60 ml/min/1.73 sqM)  


 


Est GFR (CKD-EPI)NonAf     (>60 ml/min/1.73 sqM)  


 


Glucose     (74-99)  mg/dL


 


Plasma Lactic Acid Darien     (0.7-2.0)  mmol/L


 


Calcium     (8.4-10.2)  mg/dL


 


Magnesium     (1.6-2.3)  mg/dL


 


Total Bilirubin     (0.2-1.3)  mg/dL


 


AST     (17-59)  U/L


 


ALT     (4-49)  U/L


 


Alkaline Phosphatase     ()  U/L


 


Troponin I     (0.000-0.034)  ng/mL


 


NT-Pro-B Natriuret Pep     pg/mL


 


Total Protein     (6.3-8.2)  g/dL


 


Albumin     (3.5-5.0)  g/dL


 


Urine Color    Yellow  


 


Urine Appearance    Clear  (Clear)  


 


Urine pH    5.0  (5.0-8.0)  


 


Ur Specific Gravity    1.020  (1.001-1.035)  


 


Urine Protein    Trace H  (Negative)  


 


Urine Glucose (UA)    Negative  (Negative)  


 


Urine Ketones    Negative  (Negative)  


 


Urine Blood    Small H  (Negative)  


 


Urine Nitrite    Negative  (Negative)  


 


Urine Bilirubin    Negative  (Negative)  


 


Urine Urobilinogen    2.0  (<2.0)  mg/dL


 


Ur Leukocyte Esterase    Negative  (Negative)  


 


Urine RBC    12 H  (0-5)  /hpf


 


Urine WBC    1  (0-5)  /hpf


 


Urine Bacteria    Rare H  (None)  /hpf


 


Hyaline Casts    1  (0-2)  /lpf


 


Urine Mucus    Rare H  (None)  /hpf














  07/20/22 07/20/22 07/20/22 Range/Units





  14:07 14:07 14:07 


 


WBC     (3.8-10.6)  k/uL


 


RBC     (4.30-5.90)  m/uL


 


Hgb     (13.0-17.5)  gm/dL


 


Hct     (39.0-53.0)  %


 


MCV     (80.0-100.0)  fL


 


MCH     (25.0-35.0)  pg


 


MCHC     (31.0-37.0)  g/dL


 


RDW     (11.5-15.5)  %


 


Plt Count     (150-450)  k/uL


 


MPV     


 


Neutrophils %     %


 


Lymphocytes %     %


 


Monocytes %     %


 


Eosinophils %     %


 


Basophils %     %


 


Neutrophils #     (1.3-7.7)  k/uL


 


Lymphocytes #     (1.0-4.8)  k/uL


 


Monocytes #     (0-1.0)  k/uL


 


Eosinophils #     (0-0.7)  k/uL


 


Basophils #     (0-0.2)  k/uL


 


Hypochromasia     


 


Poikilocytosis     


 


PT     (9.0-12.0)  sec


 


INR     (<1.2)  


 


APTT     (22.0-30.0)  sec


 


Sodium  131 L    (137-145)  mmol/L


 


Potassium  4.1    (3.5-5.1)  mmol/L


 


Chloride  92 L    ()  mmol/L


 


Carbon Dioxide  33 H    (22-30)  mmol/L


 


Anion Gap  6    mmol/L


 


BUN  35 H    (9-20)  mg/dL


 


Creatinine  1.72 H    (0.66-1.25)  mg/dL


 


Est GFR (CKD-EPI)AfAm  43    (>60 ml/min/1.73 sqM)  


 


Est GFR (CKD-EPI)NonAf  37    (>60 ml/min/1.73 sqM)  


 


Glucose  132 H    (74-99)  mg/dL


 


Plasma Lactic Acid Darien   1.2   (0.7-2.0)  mmol/L


 


Calcium  10.0    (8.4-10.2)  mg/dL


 


Magnesium  1.8    (1.6-2.3)  mg/dL


 


Total Bilirubin  0.9    (0.2-1.3)  mg/dL


 


AST  16 L    (17-59)  U/L


 


ALT  11    (4-49)  U/L


 


Alkaline Phosphatase  101    ()  U/L


 


Troponin I    0.119 H*  (0.000-0.034)  ng/mL


 


NT-Pro-B Natriuret Pep     pg/mL


 


Total Protein  4.9 L    (6.3-8.2)  g/dL


 


Albumin  2.7 L    (3.5-5.0)  g/dL


 


Urine Color     


 


Urine Appearance     (Clear)  


 


Urine pH     (5.0-8.0)  


 


Ur Specific Gravity     (1.001-1.035)  


 


Urine Protein     (Negative)  


 


Urine Glucose (UA)     (Negative)  


 


Urine Ketones     (Negative)  


 


Urine Blood     (Negative)  


 


Urine Nitrite     (Negative)  


 


Urine Bilirubin     (Negative)  


 


Urine Urobilinogen     (<2.0)  mg/dL


 


Ur Leukocyte Esterase     (Negative)  


 


Urine RBC     (0-5)  /hpf


 


Urine WBC     (0-5)  /hpf


 


Urine Bacteria     (None)  /hpf


 


Hyaline Casts     (0-2)  /lpf


 


Urine Mucus     (None)  /hpf














  07/20/22 Range/Units





  14:07 


 


WBC   (3.8-10.6)  k/uL


 


RBC   (4.30-5.90)  m/uL


 


Hgb   (13.0-17.5)  gm/dL


 


Hct   (39.0-53.0)  %


 


MCV   (80.0-100.0)  fL


 


MCH   (25.0-35.0)  pg


 


MCHC   (31.0-37.0)  g/dL


 


RDW   (11.5-15.5)  %


 


Plt Count   (150-450)  k/uL


 


MPV   


 


Neutrophils %   %


 


Lymphocytes %   %


 


Monocytes %   %


 


Eosinophils %   %


 


Basophils %   %


 


Neutrophils #   (1.3-7.7)  k/uL


 


Lymphocytes #   (1.0-4.8)  k/uL


 


Monocytes #   (0-1.0)  k/uL


 


Eosinophils #   (0-0.7)  k/uL


 


Basophils #   (0-0.2)  k/uL


 


Hypochromasia   


 


Poikilocytosis   


 


PT   (9.0-12.0)  sec


 


INR   (<1.2)  


 


APTT   (22.0-30.0)  sec


 


Sodium   (137-145)  mmol/L


 


Potassium   (3.5-5.1)  mmol/L


 


Chloride   ()  mmol/L


 


Carbon Dioxide   (22-30)  mmol/L


 


Anion Gap   mmol/L


 


BUN   (9-20)  mg/dL


 


Creatinine   (0.66-1.25)  mg/dL


 


Est GFR (CKD-EPI)AfAm   (>60 ml/min/1.73 sqM)  


 


Est GFR (CKD-EPI)NonAf   (>60 ml/min/1.73 sqM)  


 


Glucose   (74-99)  mg/dL


 


Plasma Lactic Acid Darien   (0.7-2.0)  mmol/L


 


Calcium   (8.4-10.2)  mg/dL


 


Magnesium   (1.6-2.3)  mg/dL


 


Total Bilirubin   (0.2-1.3)  mg/dL


 


AST   (17-59)  U/L


 


ALT   (4-49)  U/L


 


Alkaline Phosphatase   ()  U/L


 


Troponin I   (0.000-0.034)  ng/mL


 


NT-Pro-B Natriuret Pep  6640  pg/mL


 


Total Protein   (6.3-8.2)  g/dL


 


Albumin   (3.5-5.0)  g/dL


 


Urine Color   


 


Urine Appearance   (Clear)  


 


Urine pH   (5.0-8.0)  


 


Ur Specific Gravity   (1.001-1.035)  


 


Urine Protein   (Negative)  


 


Urine Glucose (UA)   (Negative)  


 


Urine Ketones   (Negative)  


 


Urine Blood   (Negative)  


 


Urine Nitrite   (Negative)  


 


Urine Bilirubin   (Negative)  


 


Urine Urobilinogen   (<2.0)  mg/dL


 


Ur Leukocyte Esterase   (Negative)  


 


Urine RBC   (0-5)  /hpf


 


Urine WBC   (0-5)  /hpf


 


Urine Bacteria   (None)  /hpf


 


Hyaline Casts   (0-2)  /lpf


 


Urine Mucus   (None)  /hpf














Disposition


Clinical Impression: 


 Lung cancer, Pneumonia, Elevated troponin





Disposition: ADMITTED AS IP TO THIS HOSP


Referrals: 


Law Cassidy DO [Primary Care Provider] - 1-2 days


Time of Disposition: 17:15

## 2022-07-20 NOTE — XR
EXAMINATION TYPE: XR chest 2V

 

DATE OF EXAM: 7/20/2022

 

COMPARISON: 4/19/2022

 

HISTORY: Shortness of breath

 

TECHNIQUE:  Frontal and lateral views of the chest are obtained.

 

FINDINGS:

 

Scattered senescent parenchymal changes noted. Hyperinflation compatible with COPD. 

 

Patchy perihilar and basilar infiltrates. Correlate for developing pneumonia.

 

Heart size is stable.

 

Mediastinal structures are stable and grossly unremarkable.

 

No evidence for hilar prominence.

 

Degenerative changes dorsal spine. 

 

IMPRESSION:

1. No evidence for acute pulmonary disease.

## 2022-07-21 LAB
ANION GAP SERPL CALC-SCNC: 4 MMOL/L
BASOPHILS # BLD AUTO: 0 K/UL (ref 0–0.2)
BASOPHILS NFR BLD AUTO: 0 %
BUN SERPL-SCNC: 45 MG/DL (ref 9–20)
CALCIUM SPEC-MCNC: 10.1 MG/DL (ref 8.4–10.2)
CHLORIDE SERPL-SCNC: 94 MMOL/L (ref 98–107)
CO2 SERPL-SCNC: 34 MMOL/L (ref 22–30)
EOSINOPHIL # BLD AUTO: 0.1 K/UL (ref 0–0.7)
EOSINOPHIL NFR BLD AUTO: 0 %
ERYTHROCYTE [DISTWIDTH] IN BLOOD BY AUTOMATED COUNT: 4.4 M/UL (ref 4.3–5.9)
ERYTHROCYTE [DISTWIDTH] IN BLOOD: 15 % (ref 11.5–15.5)
GLUCOSE SERPL-MCNC: 126 MG/DL (ref 74–99)
HCT VFR BLD AUTO: 41.7 % (ref 39–53)
HGB BLD-MCNC: 12.9 GM/DL (ref 13–17.5)
LYMPHOCYTES # SPEC AUTO: 0.6 K/UL (ref 1–4.8)
LYMPHOCYTES NFR SPEC AUTO: 3 %
MCH RBC QN AUTO: 29.4 PG (ref 25–35)
MCHC RBC AUTO-ENTMCNC: 31.1 G/DL (ref 31–37)
MCV RBC AUTO: 94.8 FL (ref 80–100)
MONOCYTES # BLD AUTO: 0.8 K/UL (ref 0–1)
MONOCYTES NFR BLD AUTO: 4 %
NEUTROPHILS # BLD AUTO: 17.3 K/UL (ref 1.3–7.7)
NEUTROPHILS NFR BLD AUTO: 91 %
PLATELET # BLD AUTO: 300 K/UL (ref 150–450)
POTASSIUM SERPL-SCNC: 4.2 MMOL/L (ref 3.5–5.1)
SODIUM SERPL-SCNC: 132 MMOL/L (ref 137–145)
WBC # BLD AUTO: 19 K/UL (ref 3.8–10.6)

## 2022-07-21 RX ADMIN — AZITHROMYCIN SCH MG: 500 TABLET, FILM COATED ORAL at 17:34

## 2022-07-21 RX ADMIN — BUDESONIDE AND FORMOTEROL FUMARATE DIHYDRATE SCH PUFF: 160; 4.5 AEROSOL RESPIRATORY (INHALATION) at 20:16

## 2022-07-21 RX ADMIN — PANTOPRAZOLE SODIUM SCH MG: 40 TABLET, DELAYED RELEASE ORAL at 17:34

## 2022-07-21 RX ADMIN — IPRATROPIUM BROMIDE AND ALBUTEROL SULFATE SCH: .5; 3 SOLUTION RESPIRATORY (INHALATION) at 12:02

## 2022-07-21 RX ADMIN — IPRATROPIUM BROMIDE AND ALBUTEROL SULFATE SCH: .5; 3 SOLUTION RESPIRATORY (INHALATION) at 15:40

## 2022-07-21 RX ADMIN — IPRATROPIUM BROMIDE AND ALBUTEROL SULFATE SCH ML: .5; 3 SOLUTION RESPIRATORY (INHALATION) at 20:13

## 2022-07-21 RX ADMIN — ATORVASTATIN CALCIUM SCH MG: 40 TABLET, FILM COATED ORAL at 20:03

## 2022-07-21 RX ADMIN — LACTULOSE SCH: 20 SOLUTION ORAL at 16:29

## 2022-07-21 RX ADMIN — METOPROLOL SUCCINATE SCH MG: 50 TABLET, EXTENDED RELEASE ORAL at 17:34

## 2022-07-21 NOTE — XR
EXAMINATION TYPE: XR chest 2V

 

DATE OF EXAM: 7/21/2022

 

COMPARISON: 7/20/2022

 

TECHNIQUE: PA and lateral views submitted.

 

HISTORY: Cough

 

FINDINGS:

Cardiac device is seen and there is underlying COPD with patchy bilateral infiltrates stable relative
 to the prior exam. Underlying mass in the left hilum not excluded. No sizable pleural effusion. No o
bvious pneumothorax given limitation of technique. Soft tissue fullness right paratracheal region.

 

IMPRESSION:

1. Patchy bilateral infiltrate. Marked soft tissue prominence right paratracheal region likely corres
ponds the previous CT described mass.

## 2022-07-21 NOTE — P.CNPUL
History of Present Illness


Consult date: 07/21/22


History of present illness: 





Francisco Sandoval


                             [address], [place] [address] [phone]





                           Pulmonology - Consult Note





Patient Name: Link Brar                 Medical Record Number: [id number]


YOB: 1945                      Patient Status: Surgical Day Care


Attending Provider: Tirso Patel                   Account Number: [id number]


Date: 07/21/22 10:26                         Initialization Date: 07/21/22 10:26








History of Present Illness


Consult date: 07/21/22


Reason for consult: lung mass


History of present illness: 





76-year-old male patient, presented to the emergency feeling quite weak and 

debilitated.  The patient was becoming weaker, more somnolent and he was having 

ongoing issues with hemoptysis.  Note that the patient was noted to have a lung 

mass and the patient was supposed to have a outpatient bronchoscopy today.  The 

patient ended up checking himself to the hospital and he was admitted 

accordingly.  The patient is known to have a remote history of question 

sarcoidosis.  He is known to have COPD.  Is a known ex-smoker.  The patient had 

a CAT scan of the chest that was done on 06/10/2022.  A CAT scan was done and 

the patient was coughing of blood and he was having pain in his mid upper 

back/posterior chest area.  The patient was found to have a 6 x 5 x 5 cm right 

paramedline posterior mediastinal mass which was ill-defined in terms of its 

margin and was associated with a 2 cm right anterolateral direct invasion of the

T2 vertebral body.  The mass was abutting and anterior displacing the trachea.  

A PET scan was done and the patient was found to have intense metabolic uptake 

within the destructive mediastinal mass consistent with neoplasm.  Note that the

patient also has heart disease.  The patient undergone a previous TAVR procedure

for severe aortic stenosis.  The patient was also given a pacemaker following 

the procedure and this procedure was done back in 2020.  As chronic stage III 

kidney disease.  At this point in time, the patient is coming comfortable.  No 

cervical shortness of breath.  He is on 3 L of oxygen nasal cannula with a pulse

ox of 98% and a chest x-ray was done today showed patchy by the pulmonary infilt

rates with marked soft tissue prominence in the right paratracheal region 

consistent with a mediastinal mass.  The patient's white cell count was at 23 

with a hemoglobin of 12, normal correlation profile, creatinine of 1.7 with a 

mean of 35.  ProBNP level was 606 140 with a minimal troponin leak of 0.119.  

EKG was showing a paced rhythm.





Review of Systems


Constitutional: Reports daytime sleepiness, Reports fatigue, Reports weakness, 

Reports weight loss


Eyes: denies as per HPI, denies blurred vision, denies bulging eye, denies dec

reased vision, denies diplopia, denies discharge, denies dry eye, denies 

irritation, denies itching, denies pain, denies photophobia, denies loss of 

peripheral vision, denies loss of vision, denies tunnel vision/blind spots


Ears: deny: decreased hearing, ear discharge, earache, tinnitus


Ears, nose, mouth and throat: Reports as per HPI


Breasts: absent: as per HPI, gynecomastia


Cardiovascular: Reports decreased exercise tolerance, Reports dyspnea on 

exertion


Respiratory: Reports cough, Reports dyspnea, Reports hemoptysis


Gastrointestinal: Reports as per HPI


Genitourinary: Reports as per HPI


Musculoskeletal: Reports as per HPI, Reports limitation of motion


Musculoskeletal: absent: ankle pain, ankle stiffness, ankle swelling, as per 

HPI, elbow pain, elbow stiffness, elbow swelling, foot pain, foot stiffness, 

foot swelling, hand pain, hand stiffness, hand swelling, hip pain, hip 

stiffness, hip swelling, knee pain, knee stiffness, knee swelling, shoulder 

pain, shoulder stiffness, shoulder swelling, wrist pain, wrist stiffness, wrist 

swelling


Integumentary: Reports as per HPI


Neurological: Reports as per HPI


Psychiatric: Reports as per HPI


Endocrine: Reports as per HPI


Hematologic/Lymphatic: Reports as per HPI


Allergic/Immunologic: Reports as per HPI





Past Medical History


Past Medical History: Asthma, Cancer, Chest Pain / Angina, GERD/Reflux, Hearing 

Disorder / Deafness, Hyperlipidemia, Hypertension, Osteoarthritis (OA), Prostate

Disorder, Renal Disease, Respiratory Disorder


Additional Past Medical History / Comment(s): Medastinal mass, posterior-

superior, 6x5x5 cm with direct invasion of T2.  COPD, SKIN CA LT ARM.,  AORTIC 

STENOSIS and a previous TAVR-2020, pacemaker insertion,   Varicose Veins. Hx 

Sarcoidosis, back pain,  hiatal hernia, constipation, 'spot on pancreas", BPH, 

chronic stage III kidney disease, HTN, hyperlipidemia,


History of Any Multi-Drug Resistant Organisms: MRSA


Date of last positivie culture/infection: 2010


MDRO Source:: sinus


Past Surgical History: Cardiac Valve Replacement, Cholecystectomy, Heart 

Catheterization, Pacemaker, Prostate Surgery


Additional Past Surgical History / Comment(s): Septoplasty.  Colonoscopy.  NECK 

GLAND BIOPSY.  BRONCH, LUNG biopsy. TVAR aortic valve replacement 2020, skin 

cancer removed left arm,


Past Anesthesia/Blood Transfusion Reactions: No Reported Reaction


Type of Cardiac Device: Permanent Pacemaker


Device Placement Date:: 2020 [employer]


Smoking Status: Former smoker





- Past Family History


  ** Sister(s)


Family Medical History: Deep Vein Thrombosis (DVT)


Additional Family Medical History / Comment(s): .





  ** Mother


Family Medical History: Cancer


Additional Family Medical History / Comment(s): BRAIN CA, COLON CA





Medications and Allergies


                                Home Medications











 Medication  Instructions  Recorded  Confirmed  Type


 


Budesonide-Formot 160-4.5 Mcg 2 puff INHALATION RT-BID 01/02/18 07/20/22 History





[Symbicort 160-4.5 Mcg Inhaler]    


 


amLODIPine BESYLATE 5 mg PO DAILY 02/28/19 07/20/22 History


 


Turmeric Root Extract [Turmeric] 500 mg PO DAILY 03/17/20 07/20/22 History


 


Apixaban [Eliquis] 5 mg PO BID 05/23/22 07/20/22 History


 


Atorvastatin Calcium [Lipitor] 40 mg PO HS 05/23/22 07/20/22 History


 


Metoprolol Succinate [Toprol XL] 50 mg PO DAILY 05/23/22 07/20/22 History


 


Omeprazole 20 mg PO BID 05/23/22 07/20/22 History


 


Tart Cherry Concentrate 1 dose PO DAILY PRN 05/23/22 07/20/22 History


 


Torsemide [Demadex] 20 mg PO DAILY 05/23/22 07/20/22 History


 


Losartan Potassium [Cozaar] 50 mg PO DAILY 30 Days #15 tab 05/24/22 07/20/22 Rx


 


Albuterol Inhaler [Ventolin Hfa 1 - 2 puff INHALATION RT-Q6H PRN 07/19/22 07/20/22 History





Inhaler]    


 


Lactulose 10 gm PO DAILY 07/19/22 07/20/22 History


 


fentaNYL [Duragesic 37.5 MCG/HR] 1 patch TRANSDERM Q72H 07/19/22 07/20/22 

History


 


oxyCODONE-APAP 10-325MG [Percocet 1 tab PO Q6HR 07/19/22 07/20/22 History





 mg]    


 


Acetaminophen Tab [Tylenol Tab] 1,000 mg PO Q6H PRN 07/20/22 07/20/22 History








                                    Allergies











Allergy/AdvReac Type Severity Reaction Status Date / Time


 


amoxicillin trihydrate Allergy  Rash/Hives Verified 07/20/22 17:30





[From Augmentin]     


 


clindamycin Allergy  Rash/Hives Verified 07/20/22 17:30


 


ibuprofen Allergy  Rash/Hives Verified 07/20/22 17:30


 


potassium clavulanate Allergy  Rash/Hives Verified 07/20/22 17:30





[From Augmentin]     


 


ragweed pollen AdvReac  SINUS Verified 07/20/22 17:30





   PROBLEMS  














Physical Exam





Gen. appearance the patient is coming comfortable no acute distress, breathing 

is nonlabored on 3 L O2 nasal cannula


Head exam was generally normal. There was no scleral icterus or corneal arcus. 

Mucous membranes were moist.


Neck was supple and without jugular venous distension, thyromegaly, or carotid b

ruits. Carotids were easily palpable bilaterally. There was no adenopathy.


Lungs sounds are diminished and patient has scattered rhonchi and scattered 

expiratory wheezes without the lung his bilaterally and the patient has a 

pacemaker pocket over the left anterior chest area.


Cardiac exam revealed the PMI to be normally situated and sized. The rhythm was 

regular and no extrasystoles were noted during several minutes of auscultation. 

The first and second heart sounds were normal and physiologic splitting of the 

second heart sound was noted. There was a systolic ejection murmur grade 3/6 in 

the precordium, rubs, clicks, or gallops.


Abdominal exam revealed normal bowel sounds. The abdomen was soft, non-tender, 

and without masses, organomegaly, or appreciable enlargement of the abdominal 

aorta.


Examination of the extremities revealed easily palpable radial, femoral and 

pedal pulses. There was no cyanosis, clubbing or edema.


Examination of the skin revealed no evidence of significant rashes, suspicious 

appearing nevi or other concerning lesions.





Results





- Diagnostic Findings


Chest x-ray: image reviewed





Assessment and Plan


Plan: 











Hemoptysis





Mediastinal mass.  The patient is a 6 x 5 x 5 cm posterior superior mediastinal 

mass, with direct anterolateral invasion of the T2 vertebral body, highly active

on PET scan and highly suspicious for tracheal wall invasion posteriorly.  This 

is most likely an underlying malignancy





 Back pain secondary to above





COPD





History aortic valve stenosis with a previous TAVR and a previous pacemaker 

insertion.  Echocardiogram from May 2022 showed preserved LV, bioprosthetic 

aortic valve which is in place and no significant leaks





Chronic stage III kidney disease





History of sarcoidosis which is currently inactive in stable





Troponin leak, nonspecific





Acute leukocytosis





Plan





Proceed with bronchoscopy, airway inspection, tracheal wall inspection.  Highly 

suspected tracheal wall invasion from the posterior mediastinal mass.  Patient 

was agreeable to procedure.  We'll continue to follow make further 

recommendations based on his progress.




















Past Medical History


Past Medical History: Asthma, Cancer, Chest Pain / Angina, GERD/Reflux, Hearing 

Disorder / Deafness, Hyperlipidemia, Hypertension, Osteoarthritis (OA), Prostate

Disorder, Renal Disease, Respiratory Disorder


Additional Past Medical History / Comment(s): SKIN CA LT ARM.  HEART MURMUR, hx 

AORTIC STENOSIS.  Varicose Veins. Hx Sarcoidosis, back pain, tumor in lung, 

hiatal hernia, constipation, 'spot on pancreas", enlarged prostate, "stage III 

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


History of Any Multi-Drug Resistant Organisms: MRSA


Date of last positivie culture/infection: 2010


MDRO Source:: sinus


Past Surgical History: Cardiac Valve Replacement, Cholecystectomy, Heart 

Catheterization, Pacemaker, Prostate Surgery


Additional Past Surgical History / Comment(s): Septoplasty.  Colonoscopy.  NECK 

GLAND BIOPSY.  BRONCH, LUNG biopsy. TVAR aortic valve replacement 2020, skin 

cancer removed left arm,


Past Anesthesia/Blood Transfusion Reactions: No Reported Reaction


Type of Cardiac Device: Permanent Pacemaker


Device Placement Date:: 2020 [employer]


Past Psychological History: No Psychological Hx Reported


Smoking Status: Former smoker


Past Alcohol Use History: None Reported


Additional Past Alcohol Use History / Comment(s): STARTED SMOKING AT AGE 16, 

QUIT IN 1974, SMOKED 3PPD


Past Drug Use History: None Reported





- Past Family History


  ** Sister(s)


Family Medical History: Deep Vein Thrombosis (DVT)


Additional Family Medical History / Comment(s): .





  ** Mother


Family Medical History: Cancer


Additional Family Medical History / Comment(s): BRAIN CA, COLON CA





Medications and Allergies


                                Home Medications











 Medication  Instructions  Recorded  Confirmed  Type


 


Budesonide-Formot 160-4.5 Mcg 2 puff INHALATION RT-BID 01/02/18 07/20/22 History





[Symbicort 160-4.5 Mcg Inhaler]    


 


amLODIPine BESYLATE 5 mg PO DAILY 02/28/19 07/20/22 History


 


Turmeric Root Extract [Turmeric] 500 mg PO DAILY 03/17/20 07/20/22 History


 


Apixaban [Eliquis] 5 mg PO BID 05/23/22 07/20/22 History


 


Atorvastatin Calcium [Lipitor] 40 mg PO HS 05/23/22 07/20/22 History


 


Metoprolol Succinate [Toprol XL] 50 mg PO DAILY 05/23/22 07/20/22 History


 


Omeprazole 20 mg PO BID 05/23/22 07/20/22 History


 


Tart Cherry Concentrate 1 dose PO DAILY PRN 05/23/22 07/20/22 History


 


Torsemide [Demadex] 20 mg PO DAILY 05/23/22 07/20/22 History


 


Losartan Potassium [Cozaar] 50 mg PO DAILY 30 Days #15 tab 05/24/22 07/20/22 Rx


 


Albuterol Inhaler [Ventolin Hfa 1 - 2 puff INHALATION RT-Q6H PRN 07/19/22 07/20/22 History





Inhaler]    


 


Lactulose 10 gm PO DAILY 07/19/22 07/20/22 History


 


fentaNYL [Duragesic 37.5 MCG/HR] 1 patch TRANSDERM Q72H 07/19/22 07/20/22 

History


 


oxyCODONE-APAP 10-325MG [Percocet 1 tab PO Q6HR 07/19/22 07/20/22 History





 mg]    


 


Acetaminophen Tab [Tylenol Tab] 1,000 mg PO Q6H PRN 07/20/22 07/20/22 History








                                    Allergies











Allergy/AdvReac Type Severity Reaction Status Date / Time


 


amoxicillin trihydrate Allergy  Rash/Hives Verified 07/20/22 17:30





[From Augmentin]     


 


clindamycin Allergy  Rash/Hives Verified 07/20/22 17:30


 


ibuprofen Allergy  Rash/Hives Verified 07/20/22 17:30


 


potassium clavulanate Allergy  Rash/Hives Verified 07/20/22 17:30





[From Augmentin]     


 


ragweed pollen AdvReac  SINUS Verified 07/20/22 17:30





   PROBLEMS  














Physical Exam


Vitals: 


                                   Vital Signs











  Temp Pulse Pulse Resp BP BP Pulse Ox


 


 07/21/22 08:00  98.1 F   86  20   113/56  98


 


 07/21/22 04:00  98.6 F   85  22   115/60  94 L


 


 07/20/22 23:03  97.9 F   92  19   121/62  92 L


 


 07/20/22 21:33  99.0 F   72  19   112/63  95


 


 07/20/22 20:06   70   20  115/64   93 L


 


 07/20/22 18:09   73   16  113/58   93 L


 


 07/20/22 15:43  97.7 F  68   16  115/50   98


 


 07/20/22 12:46  97.2 F L  73   16  97/55   95








                                Intake and Output











 07/20/22 07/21/22 07/21/22





 22:59 06:59 14:59


 


Output Total  425 200


 


Balance  -425 -200


 


Output:   


 


  Urine  425 200


 


Other:   


 


  Voiding Method  Indwelling Catheter 


 


  Weight 106.141 kg  














Results





- Laboratory Findings


CBC and BMP: 


                                 07/20/22 14:07





                                 07/20/22 14:07


PT/INR, D-dimer











PT  12.1 sec (9.0-12.0)  H  07/20/22  14:07    


 


INR  1.1  (<1.2)   07/20/22  14:07    








Abnormal lab findings: 


                                  Abnormal Labs











  07/20/22 07/20/22 07/20/22





  14:07 14:07 14:07


 


WBC  23.0 H  


 


RBC  4.07 L  


 


Hgb  12.0 L  


 


Hct  37.2 L  


 


Neutrophils #  21.5 H  


 


Lymphocytes #  0.5 L  


 


PT   12.1 H 


 


APTT   30.2 H 


 


Sodium   


 


Chloride   


 


Carbon Dioxide   


 


BUN   


 


Creatinine   


 


Glucose   


 


AST   


 


Troponin I   


 


Total Protein   


 


Albumin   


 


Urine Protein    Trace H


 


Urine Blood    Small H


 


Urine RBC    12 H


 


Urine Bacteria    Rare H


 


Urine Mucus    Rare H














  07/20/22 07/20/22





  14:07 14:07


 


WBC  


 


RBC  


 


Hgb  


 


Hct  


 


Neutrophils #  


 


Lymphocytes #  


 


PT  


 


APTT  


 


Sodium  131 L 


 


Chloride  92 L 


 


Carbon Dioxide  33 H 


 


BUN  35 H 


 


Creatinine  1.72 H 


 


Glucose  132 H 


 


AST  16 L 


 


Troponin I   0.119 H*


 


Total Protein  4.9 L 


 


Albumin  2.7 L 


 


Urine Protein  


 


Urine Blood  


 


Urine RBC  


 


Urine Bacteria  


 


Urine Mucus

## 2022-07-21 NOTE — P.HPIM
History of Present Illness


H&P Date: 07/21/22


Chief Complaint: Generalized weakness





Patient is a 76-year-old male with a long history of out, hypertension, 

hyperlipidemia, hearing Glusartel/deafness, obstructives, aortic stenosis and 

previous history of TAVR in 2020, history of permanent pacemaker placement, 

history of sarcoidosis and CKD stage III and other mild medical problems 

presents ER with complaints of generalized weakness and unable to stand up get 

out of bed.  Patient is also having chronic pain in his chest and back and also 

using fentanyl patch and OxyContin at home.  He has been having hemoptysis for 

the past 4 months and also scheduled for bronchoscopy due to recently diagnosed 

lung mass.  Patient had CT chest done on 6/10/2022.  Patient is also having loss

of weight.  No complaints of chest pain or worsening shortness of breath.  On 

admission blood pressures 97/55 and pulse 73 respiration 16 and pulse ox 95% on 

4 L oxygen via nasal cannula.


Chest x-ray showed no evidence for acute pulmonary disease.


EKG showed electronic ventricular paced rhythm.


Chest x-ray showed patchy bilateral infiltrate.  Marker soft tissue prominence 

right paratracheal region likely corresponds to the previous CT described mass.


Laboratory pressure WBC 23.0 hemoglobin 12.0 and platelets 268


INR 1.1


Sodium 131 potassium 4.1 chloride 92 bicarb 33 BUN 35 and creatinine 1.72


Troponin 0.119 and proBNP 6640 and albumin 2.7


Urinalysis negative for infection


Coronavirus PCR not detected.








Review of Systems





Constitutional: Patient denies any fever or chills .  Generalized weakness.


Abdomen: Patient denied any nausea or vomiting or abd. pain


Cardiovascular: Patient denies any chest pain or short of breath no 

palpitations.


Respiratory: patient denied any cough is from production.  No shortness of 

breath


Neurologic: Patient denied any numbness or tingling headache.


Musculoskeletal: Patient denies any complaints of joint swelling or deformity.


Skin: Negative


Psychiatric: Negative


Endocrine: No heat or cold intolerance.  No recent weight gain.


Genitourinary: No dysuria or hematuria.


All other 14 point ROS negative except the above





Past Medical History


Past Medical History: Asthma, Cancer, Chest Pain / Angina, GERD/Reflux, Hearing 

Disorder / Deafness, Hyperlipidemia, Hypertension, Osteoarthritis (OA), Prostate

Disorder, Renal Disease, Respiratory Disorder


Additional Past Medical History / Comment(s): SKIN CA LT ARM.  HEART MURMUR, hx 

AORTIC STENOSIS.  Varicose Veins. Hx Sarcoidosis, back pain, tumor in lung, h

iatal hernia, constipation, 'spot on pancreas", enlarged prostate, "stage III 

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


History of Any Multi-Drug Resistant Organisms: MRSA


Date of last positivie culture/infection: 2010


MDRO Source:: sinus


Past Surgical History: Cardiac Valve Replacement, Cholecystectomy, Heart 

Catheterization, Pacemaker, Prostate Surgery


Additional Past Surgical History / Comment(s): Septoplasty.  Colonoscopy.  NECK 

GLAND BIOPSY.  BRONCH, LUNG biopsy. TVAR aortic valve replacement 2020, skin 

cancer removed left arm,


Past Anesthesia/Blood Transfusion Reactions: No Reported Reaction


Type of Cardiac Device: Permanent Pacemaker


Device Placement Date:: 2020 Medtr[employer]


Past Psychological History: No Psychological Hx Reported


Smoking Status: Former smoker


Past Alcohol Use History: None Reported


Additional Past Alcohol Use History / Comment(s): STARTED SMOKING AT AGE 16, 

QUIT IN 1974, SMOKED 3PPD


Past Drug Use History: None Reported





- Past Family History


  ** Sister(s)


Family Medical History: Deep Vein Thrombosis (DVT)


Additional Family Medical History / Comment(s): .





  ** Mother


Family Medical History: Cancer


Additional Family Medical History / Comment(s): BRAIN CA, COLON CA





Medications and Allergies


                                Home Medications











 Medication  Instructions  Recorded  Confirmed  Type


 


Budesonide-Formot 160-4.5 Mcg 2 puff INHALATION RT-BID 01/02/18 07/20/22 History





[Symbicort 160-4.5 Mcg Inhaler]    


 


amLODIPine BESYLATE 5 mg PO DAILY 02/28/19 07/20/22 History


 


Turmeric Root Extract [Turmeric] 500 mg PO DAILY 03/17/20 07/20/22 History


 


Apixaban [Eliquis] 5 mg PO BID 05/23/22 07/20/22 History


 


Atorvastatin Calcium [Lipitor] 40 mg PO HS 05/23/22 07/20/22 History


 


Metoprolol Succinate [Toprol XL] 50 mg PO DAILY 05/23/22 07/20/22 History


 


Omeprazole 20 mg PO BID 05/23/22 07/20/22 History


 


Tart Cherry Concentrate 1 dose PO DAILY PRN 05/23/22 07/20/22 History


 


Torsemide [Demadex] 20 mg PO DAILY 05/23/22 07/20/22 History


 


Losartan Potassium [Cozaar] 50 mg PO DAILY 30 Days #15 tab 05/24/22 07/20/22 Rx


 


Albuterol Inhaler [Ventolin Hfa 1 - 2 puff INHALATION RT-Q6H PRN 07/19/22 07/20 /22 History





Inhaler]    


 


Lactulose 10 gm PO DAILY 07/19/22 07/20/22 History


 


fentaNYL [Duragesic 37.5 MCG/HR] 1 patch TRANSDERM Q72H 07/19/22 07/20/22 

History


 


oxyCODONE-APAP 10-325MG [Percocet 1 tab PO Q6HR 07/19/22 07/20/22 History





 mg]    


 


Acetaminophen Tab [Tylenol Tab] 1,000 mg PO Q6H PRN 07/20/22 07/20/22 History








                                    Allergies











Allergy/AdvReac Type Severity Reaction Status Date / Time


 


amoxicillin trihydrate Allergy  Rash/Hives Verified 07/20/22 17:30





[From Augmentin]     


 


clindamycin Allergy  Rash/Hives Verified 07/20/22 17:30


 


ibuprofen Allergy  Rash/Hives Verified 07/20/22 17:30


 


potassium clavulanate Allergy  Rash/Hives Verified 07/20/22 17:30





[From Augmentin]     


 


ragweed pollen AdvReac  SINUS Verified 07/20/22 17:30





   PROBLEMS  














Physical Exam


Vitals: 


                                   Vital Signs











  Temp Pulse Pulse Resp BP BP Pulse Ox


 


 07/21/22 08:00  98.1 F   86  20   113/56  98


 


 07/21/22 04:00  98.6 F   85  22   115/60  94 L


 


 07/20/22 23:03  97.9 F   92  19   121/62  92 L


 


 07/20/22 21:33  99.0 F   72  19   112/63  95


 


 07/20/22 20:06   70   20  115/64   93 L


 


 07/20/22 18:09   73   16  113/58   93 L


 


 07/20/22 15:43  97.7 F  68   16  115/50   98


 


 07/20/22 12:46  97.2 F L  73   16  97/55   95








                                Intake and Output











 07/20/22 07/21/22 07/21/22





 22:59 06:59 14:59


 


Output Total  425 200


 


Balance  -425 -200


 


Output:   


 


  Urine  425 200


 


Other:   


 


  Voiding Method  Indwelling Catheter 


 


  Weight 106.141 kg  














PHYSICAL EXAMINATION: 


Patient is lying in the bed comfortably, no acute distress, awake alert and 

oriented.. 


HEENT: Normocephalic. Neck is supple. Pupils reactive. Nostrils clear. Oral 

cavity is moist. 


Neck reveals no JVD, carotid bruits, or thyromegaly. 


CHEST EXAMINATION: Trachea is central. Symmetrical expansion. Lung fields clear 

to auscultation and percussion. 


CARDIAC: Normal S1, S2 with no gallops. No murmurs 


ABDOMEN: Soft. Bowel sounds present.  Nontender.  No organomegaly. No abdominal 

bruits. 


Extremities: reveal no edema.  No clubbing or cyanosis


Neurologically awake, alert, oriented x3 with well-coordinated movements.  No 

focal deficits noted


Skin: No rash or skin lesions. 


Psychiatric: Coperative.  Nonsuicidal, anxious.


Musculoskeletal: No joint swelling or deformity.  Normal range of motion.





Results


CBC & Chem 7: 


                                 07/21/22 14:03





                                 07/21/22 14:03


Labs: 


                  Abnormal Lab Results - Last 24 Hours (Table)











  07/20/22 07/20/22 07/20/22 Range/Units





  14:07 14:07 14:07 


 


WBC  23.0 H    (3.8-10.6)  k/uL


 


RBC  4.07 L    (4.30-5.90)  m/uL


 


Hgb  12.0 L    (13.0-17.5)  gm/dL


 


Hct  37.2 L    (39.0-53.0)  %


 


Neutrophils #  21.5 H    (1.3-7.7)  k/uL


 


Lymphocytes #  0.5 L    (1.0-4.8)  k/uL


 


PT   12.1 H   (9.0-12.0)  sec


 


APTT   30.2 H   (22.0-30.0)  sec


 


Sodium     (137-145)  mmol/L


 


Chloride     ()  mmol/L


 


Carbon Dioxide     (22-30)  mmol/L


 


BUN     (9-20)  mg/dL


 


Creatinine     (0.66-1.25)  mg/dL


 


Glucose     (74-99)  mg/dL


 


AST     (17-59)  U/L


 


Troponin I     (0.000-0.034)  ng/mL


 


Total Protein     (6.3-8.2)  g/dL


 


Albumin     (3.5-5.0)  g/dL


 


Urine Protein    Trace H  (Negative)  


 


Urine Blood    Small H  (Negative)  


 


Urine RBC    12 H  (0-5)  /hpf


 


Urine Bacteria    Rare H  (None)  /hpf


 


Urine Mucus    Rare H  (None)  /hpf














  07/20/22 07/20/22 Range/Units





  14:07 14:07 


 


WBC    (3.8-10.6)  k/uL


 


RBC    (4.30-5.90)  m/uL


 


Hgb    (13.0-17.5)  gm/dL


 


Hct    (39.0-53.0)  %


 


Neutrophils #    (1.3-7.7)  k/uL


 


Lymphocytes #    (1.0-4.8)  k/uL


 


PT    (9.0-12.0)  sec


 


APTT    (22.0-30.0)  sec


 


Sodium  131 L   (137-145)  mmol/L


 


Chloride  92 L   ()  mmol/L


 


Carbon Dioxide  33 H   (22-30)  mmol/L


 


BUN  35 H   (9-20)  mg/dL


 


Creatinine  1.72 H   (0.66-1.25)  mg/dL


 


Glucose  132 H   (74-99)  mg/dL


 


AST  16 L   (17-59)  U/L


 


Troponin I   0.119 H*  (0.000-0.034)  ng/mL


 


Total Protein  4.9 L   (6.3-8.2)  g/dL


 


Albumin  2.7 L   (3.5-5.0)  g/dL


 


Urine Protein    (Negative)  


 


Urine Blood    (Negative)  


 


Urine RBC    (0-5)  /hpf


 


Urine Bacteria    (None)  /hpf


 


Urine Mucus    (None)  /hpf








                      Microbiology - Last 24 Hours (Table)











 07/20/22 18:05 Blood Culture - Final





 Blood 


 


 07/20/22 18:02 Blood Culture - Final





 Blood 


 


 07/20/22 20:31 Gram Stain - Preliminary





 Sputum Sputum Culture - Preliminary














Thrombosis Risk Factor Assmnt





- DVT/VTE Prophylaxis


DVT/VTE Prophylaxis: Pharmacologic Prophylaxis ordered





- Choose All That Apply


Any of the Below Risk Factors Present?: Yes


Each Factor Represents 1 point: Abnormal pulmonary function (COPD)


Each Risk Factor Represents 3 Points: Age 75 years or older


Thrombosis Risk Factor Assessment Total Risk Factor Score: 4


Thrombosis Risk Factor Assessment Level: Moderate Risk





Assessment and Plan


Assessment: 








Right paratracheal lung mass suspicious for malignancy.


Recent history of hemoptysis


Hypovolemic hyponatremia


Acute kidney injury likely prerenal with CKD stage III


Troponin leak 


leukocytosis


COPD not in exacerbation


Chronic back pain


History of aortic stenosis status post TAVR in 2020


History of sarcoidosis


DVT prophylaxis with heparin subcu





Plan:


Patient will be oxygen supplementation and antibiotics in the form of 

ceftriaxone azithromycin.  Pulmonary is planning for bronchoscopy today.  

Continue with DuoNebs.


Current pain management and home medications including blood pressure 

medications.  Currently on fentanyl patch and Percocet 10.  Follow closely.  

Prognosis guarded at this time.





Time with Patient: Greater than 30

## 2022-07-21 NOTE — P.PCN
Date of Procedure: 07/21/22


Preoperative Diagnosis: 


Superior mediastinal mass


Postoperative Diagnosis: 


Superior mediastinal mass


Procedure(s) Performed: 


EBUS bronchoscopy and transbronchial needle aspirate of superior mediastinal 

mass


Anesthesia: BRITTANY


Surgeon: Miriam Ba


Assistant #1: Maame Ward


Estimated Blood Loss (ml): 0


Pathology: other


Condition: stable


Disposition: floor


Operative Findings: 


This procedure was done and a endoscopy room.  A consent was obtained.  After 

achieving adequate sedation, the patient was intubated by CRNA and placed on a 

mechanical ventilator.  Following that, the flexible bronchoscope was introduced

and the entire trachea was examined and visualized.  As his trach was being 

visualized, the EEG was pulled all the way up to the subglottic area just next 

to the cricoid.  There was some minimal swelling/irregularities along the poste

rior wall of the trachea in its midportion, however there was not the bronchial 

tumors noted.  There was probably some mass effect also in the same area and the

membranous trachea was looking to be compressed and the mucosa was swollen and 

folded.  The airway was completed.  Bilateral mainstem bronchi, right upper 

lobeBronchus, bronchus intermedius, right middle lobe and right lower lobe 

bronchus along with a based and segments on the right and then examination of 

the left mainstem bronchus, left upper and left lower lobe rhonchi and the 

various 8 segments on the left was visualized.  There was some looseness for 

secretions were identified and there was no evidence of any endobronchial tumors

or lesions or abnormalities.  Therapeutic airway suctioning was done.





Following that EBUS bronchoscopy was performed.  The entire trachea was 

identified.  The aortic arch was identified.  Following that, looking through 

the posterior wall of the trachea, the posterior superior mediastinal mass was 

identified.  This was a large 5 x 6 cm mass seen on EBUS.  Transbronchial needle

aspirate of the posterior mediastinal mass was done under EBUS guidance.  Rapid 

on-site pathologic evaluation was done.  Adequacy of the samples were confirmed.

 Following that, a total of 5 passes was done using EBUS guidance.  Bronchoscope

was removed.  Flexible bronchoscope was again introduced and therapeutic it was 

suctioning was done.  The procedure was completed.  He was terminated.  

Bronchus, was removed.  The patient was extubated and transferred to recovery in

stable condition.  Awaiting final pathology results.

## 2022-07-22 LAB
ANION GAP SERPL CALC-SCNC: 5 MMOL/L
BASOPHILS # BLD AUTO: 0 K/UL (ref 0–0.2)
BASOPHILS NFR BLD AUTO: 0 %
BUN SERPL-SCNC: 49 MG/DL (ref 9–20)
CALCIUM SPEC-MCNC: 10 MG/DL (ref 8.4–10.2)
CHLORIDE SERPL-SCNC: 94 MMOL/L (ref 98–107)
CO2 SERPL-SCNC: 34 MMOL/L (ref 22–30)
EOSINOPHIL # BLD AUTO: 0 K/UL (ref 0–0.7)
EOSINOPHIL NFR BLD AUTO: 0 %
ERYTHROCYTE [DISTWIDTH] IN BLOOD BY AUTOMATED COUNT: 4.17 M/UL (ref 4.3–5.9)
ERYTHROCYTE [DISTWIDTH] IN BLOOD: 14.8 % (ref 11.5–15.5)
GLUCOSE BLD-MCNC: 209 MG/DL (ref 70–110)
GLUCOSE SERPL-MCNC: 150 MG/DL (ref 74–99)
HCT VFR BLD AUTO: 39 % (ref 39–53)
HGB BLD-MCNC: 11.8 GM/DL (ref 13–17.5)
LYMPHOCYTES # SPEC AUTO: 0.5 K/UL (ref 1–4.8)
LYMPHOCYTES NFR SPEC AUTO: 3 %
MCH RBC QN AUTO: 28.4 PG (ref 25–35)
MCHC RBC AUTO-ENTMCNC: 30.3 G/DL (ref 31–37)
MCV RBC AUTO: 93.5 FL (ref 80–100)
MONOCYTES # BLD AUTO: 0.8 K/UL (ref 0–1)
MONOCYTES NFR BLD AUTO: 5 %
NEUTROPHILS # BLD AUTO: 15.6 K/UL (ref 1.3–7.7)
NEUTROPHILS NFR BLD AUTO: 92 %
PLATELET # BLD AUTO: 247 K/UL (ref 150–450)
POTASSIUM SERPL-SCNC: 4.5 MMOL/L (ref 3.5–5.1)
SODIUM SERPL-SCNC: 133 MMOL/L (ref 137–145)
WBC # BLD AUTO: 17 K/UL (ref 3.8–10.6)

## 2022-07-22 RX ADMIN — IPRATROPIUM BROMIDE AND ALBUTEROL SULFATE SCH: .5; 3 SOLUTION RESPIRATORY (INHALATION) at 11:12

## 2022-07-22 RX ADMIN — METHYLPREDNISOLONE SODIUM SUCCINATE SCH MG: 125 INJECTION, POWDER, FOR SOLUTION INTRAMUSCULAR; INTRAVENOUS at 17:37

## 2022-07-22 RX ADMIN — LACTULOSE SCH: 20 SOLUTION ORAL at 09:34

## 2022-07-22 RX ADMIN — METOPROLOL SUCCINATE SCH MG: 50 TABLET, EXTENDED RELEASE ORAL at 09:34

## 2022-07-22 RX ADMIN — METHYLPREDNISOLONE SODIUM SUCCINATE SCH MG: 125 INJECTION, POWDER, FOR SOLUTION INTRAMUSCULAR; INTRAVENOUS at 23:29

## 2022-07-22 RX ADMIN — BUDESONIDE AND FORMOTEROL FUMARATE DIHYDRATE SCH PUFF: 160; 4.5 AEROSOL RESPIRATORY (INHALATION) at 19:41

## 2022-07-22 RX ADMIN — OXYCODONE HYDROCHLORIDE AND ACETAMINOPHEN PRN EACH: 10; 325 TABLET ORAL at 09:39

## 2022-07-22 RX ADMIN — OXYCODONE HYDROCHLORIDE AND ACETAMINOPHEN PRN EACH: 10; 325 TABLET ORAL at 19:45

## 2022-07-22 RX ADMIN — IPRATROPIUM BROMIDE AND ALBUTEROL SULFATE SCH ML: .5; 3 SOLUTION RESPIRATORY (INHALATION) at 15:44

## 2022-07-22 RX ADMIN — METHYLPREDNISOLONE SODIUM SUCCINATE SCH MG: 125 INJECTION, POWDER, FOR SOLUTION INTRAMUSCULAR; INTRAVENOUS at 12:20

## 2022-07-22 RX ADMIN — BUDESONIDE AND FORMOTEROL FUMARATE DIHYDRATE SCH PUFF: 160; 4.5 AEROSOL RESPIRATORY (INHALATION) at 08:04

## 2022-07-22 RX ADMIN — PANTOPRAZOLE SODIUM SCH MG: 40 TABLET, DELAYED RELEASE ORAL at 06:31

## 2022-07-22 RX ADMIN — IPRATROPIUM BROMIDE AND ALBUTEROL SULFATE SCH ML: .5; 3 SOLUTION RESPIRATORY (INHALATION) at 08:04

## 2022-07-22 RX ADMIN — AZITHROMYCIN SCH MG: 500 TABLET, FILM COATED ORAL at 09:34

## 2022-07-22 RX ADMIN — CEFEPIME HYDROCHLORIDE SCH MLS/HR: 2 INJECTION, POWDER, FOR SOLUTION INTRAVENOUS at 23:28

## 2022-07-22 RX ADMIN — IPRATROPIUM BROMIDE AND ALBUTEROL SULFATE SCH ML: .5; 3 SOLUTION RESPIRATORY (INHALATION) at 19:41

## 2022-07-22 RX ADMIN — ATORVASTATIN CALCIUM SCH MG: 40 TABLET, FILM COATED ORAL at 19:45

## 2022-07-22 NOTE — P.CONS
History of Present Illness





- Reason for Consult


Consult date: 07/22/22


Positive blood culture


Requesting physician: Kathia Florez





- Chief Complaint


Increasing shortness of breath x few days





- History of Present Illness


Patient is a 76-year-old  male with recent diagnosis of lung cancer and

the patient was scheduled for bronchoscopy 7/21/2022, patient presenting to the 

hospital 7/20/2022 for evaluation of weakness and the patient was unable to 

stand up or get out of the bed patient with complaint of substernal chest pain 

for the patient with fentanyl patch is also complaining of cough and hemoptysis 

that has been going on for about 4 months patient denies having any nausea no 

vomiting no choking on food no abdominal pain or any diarrhea patient on 

presentation to the hospital was afebrile and no fever has been recorded 

subsequently patient did have white count 23,000 with a left shift which is down

to 17,000 did have elevated BUN/creatinine procalcitonin was elevated at 4.10 

urine has been negative COVID testing was negative patient blood cultures 

growing staph epi as well as gram-negative bacilli patient is currently being 

treated with the ceftriaxone 2 g daily infectious he was consulted for further 

management of antibiotic therapy because of his bacteremia








Review of Systems





Positive point has been  mentioned in the HPI rest of the systems are negative





Past Medical History


Past Medical History: Asthma, Cancer, Chest Pain / Angina, GERD/Reflux, Hearing 

Disorder / Deafness, Hyperlipidemia, Hypertension, Osteoarthritis (OA), Prostate

Disorder, Renal Disease, Respiratory Disorder


Additional Past Medical History / Comment(s): SKIN CA LT ARM.  HEART MURMUR, hx 

AORTIC STENOSIS.  Varicose Veins. Hx Sarcoidosis, back pain, tumor in lung, 

hiatal hernia, constipation, 'spot on pancreas", enlarged prostate, "stage III 

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


History of Any Multi-Drug Resistant Organisms: MRSA


Year Discovered:: 2010


MDRO Source:: sinus


Past Surgical History: Cardiac Valve Replacement, Cholecystectomy, Heart Kathleen

terization, Pacemaker, Prostate Surgery


Additional Past Surgical History / Comment(s): Septoplasty.  Colonoscopy.  NECK 

GLAND BIOPSY.  BRONCH, LUNG biopsy. TVAR aortic valve replacement 2020, skin 

cancer removed left arm,


Past Anesthesia/Blood Transfusion Reactions: No Reported Reaction


Type of Cardiac Device: Permanent Pacemaker


Device Placement Date:: 2020 [employer]


Past Psychological History: No Psychological Hx Reported


Smoking Status: Former smoker


Past Alcohol Use History: None Reported


Additional Past Alcohol Use History / Comment(s): STARTED SMOKING AT AGE 16, 

QUIT IN 1974, SMOKED 3PPD


Past Drug Use History: None Reported





- Past Family History


  ** Sister(s)


Family Medical History: Deep Vein Thrombosis (DVT)


Additional Family Medical History / Comment(s): .





  ** Mother


Family Medical History: Cancer


Additional Family Medical History / Comment(s): BRAIN CA, COLON CA





Medications and Allergies


                                Home Medications











 Medication  Instructions  Recorded  Confirmed  Type


 


Budesonide-Formot 160-4.5 Mcg 2 puff INHALATION RT-BID 01/02/18 07/20/22 History





[Symbicort 160-4.5 Mcg Inhaler]    


 


amLODIPine BESYLATE 5 mg PO DAILY 02/28/19 07/20/22 History


 


Turmeric Root Extract [Turmeric] 500 mg PO DAILY 03/17/20 07/20/22 History


 


Apixaban [Eliquis] 5 mg PO BID 05/23/22 07/20/22 History


 


Atorvastatin Calcium [Lipitor] 40 mg PO HS 05/23/22 07/20/22 History


 


Metoprolol Succinate [Toprol XL] 50 mg PO DAILY 05/23/22 07/20/22 History


 


Omeprazole 20 mg PO BID 05/23/22 07/20/22 History


 


Tart Cherry Concentrate 1 dose PO DAILY PRN 05/23/22 07/20/22 History


 


Torsemide [Demadex] 20 mg PO DAILY 05/23/22 07/20/22 History


 


Losartan Potassium [Cozaar] 50 mg PO DAILY 30 Days #15 tab 05/24/22 07/20/22 Rx


 


Albuterol Inhaler [Ventolin Hfa 1 - 2 puff INHALATION RT-Q6H PRN 07/19/22 07/20/22 History





Inhaler]    


 


Lactulose 10 gm PO DAILY 07/19/22 07/20/22 History


 


fentaNYL [Duragesic 37.5 MCG/HR] 1 patch TRANSDERM Q72H 07/19/22 07/20/22 

History


 


oxyCODONE-APAP 10-325MG [Percocet 1 tab PO Q6HR 07/19/22 07/20/22 History





 mg]    


 


Acetaminophen Tab [Tylenol Tab] 1,000 mg PO Q6H PRN 07/20/22 07/20/22 History








                                    Allergies











Allergy/AdvReac Type Severity Reaction Status Date / Time


 


amoxicillin trihydrate Allergy  Rash/Hives Verified 07/20/22 17:30





[From Augmentin]     


 


clindamycin Allergy  Rash/Hives Verified 07/20/22 17:30


 


ibuprofen Allergy  Rash/Hives Verified 07/20/22 17:30


 


potassium clavulanate Allergy  Rash/Hives Verified 07/20/22 17:30





[From Augmentin]     


 


ragweed pollen AdvReac  SINUS Verified 07/20/22 17:30





   PROBLEMS  














Physical Exam


Vitals: 


                                   Vital Signs











  Temp Pulse Pulse Resp BP Pulse Ox FiO2


 


 07/22/22 08:21   76     


 


 07/22/22 08:06   88     


 


 07/22/22 08:00  97.7 F   68  16  115/65  94 L 


 


 07/22/22 04:00  97.5 F L   82  18  107/56  97 


 


 07/22/22 00:00  97.7 F   77  18  131/63  96 


 


 07/21/22 20:27   92     


 


 07/21/22 20:14   90     


 


 07/21/22 20:00  98.1 F   90  19  124/60  93 L 


 


 07/21/22 18:00       95 


 


 07/21/22 16:30        50


 


 07/21/22 16:00    60  18  106/57  100 


 


 07/21/22 13:49        50








                                Intake and Output











 07/21/22 07/22/22 07/22/22





 22:59 06:59 14:59


 


Output Total  250 150


 


Balance  -250 -150


 


Output:   


 


  Urine  250 150


 


Other:   


 


  Voiding Method Indwelling Catheter Indwelling Catheter 











GENERAL DESCRIPTION: An elderly male lying in bed, no distress. No tachypnea or 

accessory muscle of respiration use.


HEENT: Shows Pallor , no scleral icterus. Oral mucous membrane is dry. No pharyn

geal erythema or thrush


NECK: Trachea central, no thyromegaly.


LUNGS: Unlabored breathing.  Coarse breath sounds bilaterally.


HEART: S1, S2, regular rate and rhythm. No loud murmur


ABDOMEN: Soft, no tenderness , guarding or rigidity, no organomegaly


EXTREMITIES: One plus edema of feet.


SKIN: No rash, no masses palpable.


NEUROLOGICAL: The patient is awake, alert, oriented x3, mood and affect normal.

















Results


CBC & Chem 7: 


                                 07/22/22 07:58





                                 07/22/22 07:58


Labs: 


                  Abnormal Lab Results - Last 24 Hours (Table)











  07/21/22 07/21/22 07/22/22 Range/Units





  14:03 14:03 07:58 


 


WBC  19.0 H   17.0 H  (3.8-10.6)  k/uL


 


RBC    4.17 L  (4.30-5.90)  m/uL


 


Hgb  12.9 L   11.8 L  (13.0-17.5)  gm/dL


 


MCHC    30.3 L  (31.0-37.0)  g/dL


 


Neutrophils #  17.3 H   15.6 H  (1.3-7.7)  k/uL


 


Lymphocytes #  0.6 L   0.5 L  (1.0-4.8)  k/uL


 


Sodium   132 L   (137-145)  mmol/L


 


Chloride   94 L   ()  mmol/L


 


Carbon Dioxide   34 H   (22-30)  mmol/L


 


BUN   45 H   (9-20)  mg/dL


 


Creatinine   1.60 H   (0.66-1.25)  mg/dL


 


Glucose   126 H   (74-99)  mg/dL














  07/22/22 Range/Units





  07:58 


 


WBC   (3.8-10.6)  k/uL


 


RBC   (4.30-5.90)  m/uL


 


Hgb   (13.0-17.5)  gm/dL


 


MCHC   (31.0-37.0)  g/dL


 


Neutrophils #   (1.3-7.7)  k/uL


 


Lymphocytes #   (1.0-4.8)  k/uL


 


Sodium  133 L  (137-145)  mmol/L


 


Chloride  94 L  ()  mmol/L


 


Carbon Dioxide  34 H  (22-30)  mmol/L


 


BUN  49 H  (9-20)  mg/dL


 


Creatinine  1.47 H  (0.66-1.25)  mg/dL


 


Glucose  150 H  (74-99)  mg/dL








                      Microbiology - Last 24 Hours (Table)











 07/20/22 18:02 Blood Culture Gram Stain - Preliminary





 Blood Blood Culture - Preliminary





    Staphylococcus epidermidis


 


 07/20/22 18:05 Blood Culture Gram Stain - Preliminary





 Blood 


 


 07/20/22 18:05 Blood Culture - Final





 Blood 


 


 07/20/22 18:02 Blood Culture - Final





 Blood 














Assessment and Plan


(1) Bacteremia


Current Visit: Yes   Status: Acute   Code(s): R78.81 - BACTEREMIA   SNOMED 

Code(s): 8256686


   





(2) Pneumonia


Current Visit: Yes   Status: Acute   Code(s): J18.9 - PNEUMONIA, UNSPECIFIED 

ORGANISM   SNOMED Code(s): 072798652


   


Plan: 


1patient with positive blood culture with staph epi likely skin contamination.


2patient with gram-negative bacteremia source could be likely pneumonia in this

patient do have underlying lung cancer and did have significant respiratory 

symptoms patient urine is negative abdominal submental examination.


3penicillin allergy that would limit number of antibiotics safe to use.


4discontinue Rocephin.


5start the patient cefepime 2 g every 8 hour and blood cultures will be 

repeated document clearance of bacteremia.


We will follow on clinical condition and cultures to further adjust medication 

if needed


Thank you for this consultation will follow this patient along with you

## 2022-07-22 NOTE — CT
EXAMINATION TYPE: CT CervThoracic spine wo con

 

DATE OF EXAM: 7/22/2022

 

COMPARISON: PET CT scan 7/8/2022

 

HISTORY: concern for spinal cord compression

 

CT DLP: 1585.6 mGycm

Automated exposure control for dose reduction was used.

 

Images obtained from the skull base to the L1 vertebra with no contrast.

 

There is a thoracolumbar mild levoscoliosis. There is extensive coarse infiltrate in the right lung. 
There is irregular right pleural effusion. There is small left pleural effusion. There are destructiv
e lesions involving the T2 and T3 and T4 vertebral bodies anteriorly. There is large posterior medias
tinal mass adjacent to the destructive lesions that measures 7 x 4 cm. There is evidence of involveme
nt of the esophagus. There is anterior displacement of the esophagus. There is T3 compression fractur
e of 50%. Spinal canal is not optimally evaluated. There appears to be tumor extension into the spina
l canal at the T3 level and 50% narrowing of the spinal canal.

 

The cervical vertebra have normal alignment. No compression fracture. No sign of cervical spinal sten
osis. There is mild cervical hypertrophic facet arthropathy.

 

IMPRESSION:

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
ompression fracture of T3. There is evidence for tumor extension into the spinal canal 50% narrowing.
 Detail is limited. MR scan would be helpful for definitive evaluation of the spinal canal. Bone dest
ruction has progressed compared to the recent CT scan of 7/8/2022.

 

Extensive infiltrate in the right lung with pleural thickening.

## 2022-07-22 NOTE — P.PN
Subjective


Progress Note Date: 07/22/22





76-year-old male patient, presented to the emergency feeling quite weak and 

debilitated.  The patient was becoming weaker, more somnolent and he was having 

ongoing issues with hemoptysis.  Note that the patient was noted to have a lung 

mass and the patient was supposed to have a outpatient bronchoscopy today.  The 

patient ended up checking himself to the hospital and he was admitted 

accordingly.  The patient is known to have a remote history of question 

sarcoidosis.  He is known to have COPD.  Is a known ex-smoker.  The patient had 

a CAT scan of the chest that was done on 06/10/2022.  A CAT scan was done and 

the patient was coughing of blood and he was having pain in his mid upper 

back/posterior chest area.  The patient was found to have a 6 x 5 x 5 cm right 

paramedline posterior mediastinal mass which was ill-defined in terms of its 

margin and was associated with a 2 cm right anterolateral direct invasion of the

T2 vertebral body.  The mass was abutting and anterior displacing the trachea.  

A PET scan was done and the patient was found to have intense metabolic uptake 

within the destructive mediastinal mass consistent with neoplasm.  Note that the

patient also has heart disease.  The patient undergone a previous TAVR procedure

for severe aortic stenosis.  The patient was also given a pacemaker following 

the procedure and this procedure was done back in 2020.  As chronic stage III ki

dney disease.  At this point in time, the patient is coming comfortable.  No 

cervical shortness of breath.  He is on 3 L of oxygen nasal cannula with a pulse

ox of 98% and a chest x-ray was done today showed patchy by the pulmonary 

infiltrates with marked soft tissue prominence in the right paratracheal region 

consistent with a mediastinal mass.  The patient's white cell count was at 23 

with a hemoglobin of 12, normal correlation profile, creatinine of 1.7 with a 

mean of 35.  ProBNP level was 606 140 with a minimal troponin leak of 0.119.  

EKG was showing a paced rhythm.





The patient is seen today 07/22/2022 in follow-up on the selective care unit.  

He is currently awake and alert in no acute distress.  He has a loose 

nonproductive cough.  Maintaining saturations in the mid 90s on 3 L/m per nasal 

cannula.  He has some complaints of upper extremity weakness.  Blood culture is 

positive for gram-negative bacilli sputum culture pending.  White count 17.0.  

Hemoglobin 11.8.  Sodium 133.  Potassium 4.5.  BUN 4.  Creatinine 1.47.  Glucose

150.  He did undergo EBUS bronchoscopy with FNA of the superior mediastinal 

mass.  Pathology is pending.  He remains on DuoNeb inhalations, Symbicort, a

ntibiotics in the form of ceftriaxone. 





Objective





- Vital Signs


Vital signs: 


                                   Vital Signs











Temp  97.7 F   07/22/22 08:00


 


Pulse  60   07/22/22 14:00


 


Resp  18   07/22/22 14:00


 


BP  106/60   07/22/22 12:00


 


Pulse Ox  95   07/22/22 12:00


 


FiO2  50   07/21/22 16:30








                                 Intake & Output











 07/21/22 07/22/22 07/22/22





 18:59 06:59 18:59


 


Intake Total 900  


 


Output Total 200 250 150


 


Balance 700 -250 -150


 


Intake:   


 


    


 


Output:   


 


  Urine 200 250 150


 


Other:   


 


  Voiding Method  Indwelling Catheter 














- Exam





Gen. appearance: A pleasant 76-year-old male patient, on 3 L nasal cannula, the 

patient is comfortable no acute distress, is complaining of some upper extremity

weakness


Head exam was generally normal. There was no scleral icterus or corneal arcus. 

Mucous membranes were moist.


Neck was supple and without jugular venous distension, thyromegaly, or carotid 

bruits. Carotids were easily palpable bilaterally. There was no adenopathy.


Lungs sounds are diminished and patient has scattered rhonchi and scattered 

expiratory wheezes without the lung his bilaterally and the patient has a 

pacemaker pocket over the left anterior chest area.


Cardiac exam revealed the PMI to be normally situated and sized. The rhythm was 

regular and no extrasystoles were noted during several minutes of auscultation. 

The first and second heart sounds were normal and physiologic splitting of the 

second heart sound was noted. There was a systolic ejection murmur grade 3/6 in 

the precordium, rubs, clicks, or gallops.


Abdominal exam revealed normal bowel sounds. The abdomen was soft, non-tender, 

and without masses, organomegaly, or appreciable enlargement of the abdominal 

aorta.


Examination of the extremities revealed easily palpable radial, femoral and 

pedal pulses. There was no cyanosis, clubbing or edema.


Examination of the skin revealed no evidence of significant rashes, suspicious 

appearing nevi or other concerning lesions.





- Labs


CBC & Chem 7: 


                                 07/22/22 07:58





                                 07/22/22 07:58


Labs: 


                  Abnormal Lab Results - Last 24 Hours (Table)











  07/21/22 07/22/22 07/22/22 Range/Units





  14:03 07:58 07:58 


 


WBC   17.0 H   (3.8-10.6)  k/uL


 


RBC   4.17 L   (4.30-5.90)  m/uL


 


Hgb   11.8 L   (13.0-17.5)  gm/dL


 


MCHC   30.3 L   (31.0-37.0)  g/dL


 


Neutrophils #   15.6 H   (1.3-7.7)  k/uL


 


Lymphocytes #   0.5 L   (1.0-4.8)  k/uL


 


Sodium  132 L   133 L  (137-145)  mmol/L


 


Chloride  94 L   94 L  ()  mmol/L


 


Carbon Dioxide  34 H   34 H  (22-30)  mmol/L


 


BUN  45 H   49 H  (9-20)  mg/dL


 


Creatinine  1.60 H   1.47 H  (0.66-1.25)  mg/dL


 


Glucose  126 H   150 H  (74-99)  mg/dL








                      Microbiology - Last 24 Hours (Table)











 07/20/22 18:05 Blood Culture Gram Stain - Preliminary





 Blood Blood Culture - Preliminary





    Gram Neg Bacilli


 


 07/20/22 18:02 Blood Culture Gram Stain - Preliminary





 Blood Blood Culture - Preliminary





    Staphylococcus epidermidis














Assessment and Plan


Assessment: 





Hemoptysis, no evidence of endobronchial lesion or bleeding





Mediastinal mass.  The patient is a 6 x 5 x 5 cm posterior superior mediastinal 

mass, with direct anterolateral invasion of the T2 vertebral body, highly active

on PET scan and highly suspicious for tracheal wall invasion posteriorly.  This 

is most likely an underlying malignancy.  Status post EBUS bronchoscopy with FNA

of the mediastinal mass.  Pathology pending





Bacteremia secondary to gram-negative bacilli





Back pain secondary to above with some upper extremity weakness





COPD





History aortic valve stenosis with a previous TAVR and a previous pacemaker 

insertion.  Echocardiogram from May 2022 showed preserved LV, bioprosthetic 

aortic valve which is in place and no significant leaks





Chronic stage III kidney disease





History of sarcoidosis which is currently inactive in stable





Troponin leak, nonspecific





Acute leukocytosis





Plan:





The patient was seen and evaluated


Labs and medications reviewed


Blood cultures positive for gram-negative bacilli


Infectious disease consultant


Currently on ceftriaxone


Initiate IV Solu-Medrol 60 mg every 6 hours


Lasix 40 mg IVP 1


Continue Symbicort, DuoNeb inhalations


Titrate down the FiO2 as tolerated


Obtain an MRI of the cervical spine due to upper extremity weakness and possible

cord involvement


We will continue to follow and make further recommendations based on his 

clinical status





I have personally seen and examined the patient, performed the documentation and

the assessment and plan as written. 





I have personally seen and examined the patient and reviewed the documentation. 

I performed a joint evaluation with the nurse practitioner in this evaluation 

was done more than 20 minutes.  I fully agree with the documentation above and 

the plan of care..  The patient is doing well.  The patient is currently off 

BiPAP.  Note that the patient had to be placed on BiPAP briefly after the 

procedure the patient is currently on oxygen by nasal cannula.  He has COPD 

exacerbation and the patient will be started on some steroids.  In terms of the 

possible culture, the patient was found to have gram-negative bacillus and the 

blood and this is obviously concerned as the patient has a pacemaker and a TAVR 

procedure done earlier.  The patient is currently on IV Rocephin 2 g every 24 

hours.  We'll check a pro-calcitonin level.  We'll place a infectious disease 

consultation.  We'll be awaiting final pathology from the mediastinal mass.  

Also, we'll obtain a MRI of the spine as the patient is having motor weakness in

the upper extremity bilaterally.  The MRI will be needed for possibility of a 

cord compression.  The patient's pacemaker is MRI compatible.

## 2022-07-23 LAB
GLUCOSE BLD-MCNC: 202 MG/DL (ref 70–110)
GLUCOSE BLD-MCNC: 218 MG/DL (ref 70–110)
GLUCOSE BLD-MCNC: 253 MG/DL (ref 70–110)
GLUCOSE BLD-MCNC: 256 MG/DL (ref 70–110)

## 2022-07-23 RX ADMIN — METHYLPREDNISOLONE SODIUM SUCCINATE SCH MG: 125 INJECTION, POWDER, FOR SOLUTION INTRAMUSCULAR; INTRAVENOUS at 17:07

## 2022-07-23 RX ADMIN — ATORVASTATIN CALCIUM SCH MG: 40 TABLET, FILM COATED ORAL at 19:52

## 2022-07-23 RX ADMIN — IPRATROPIUM BROMIDE AND ALBUTEROL SULFATE SCH ML: .5; 3 SOLUTION RESPIRATORY (INHALATION) at 16:31

## 2022-07-23 RX ADMIN — CEFEPIME HYDROCHLORIDE SCH MLS/HR: 2 INJECTION, POWDER, FOR SOLUTION INTRAVENOUS at 09:07

## 2022-07-23 RX ADMIN — METHYLPREDNISOLONE SODIUM SUCCINATE SCH MG: 125 INJECTION, POWDER, FOR SOLUTION INTRAMUSCULAR; INTRAVENOUS at 23:16

## 2022-07-23 RX ADMIN — METHYLPREDNISOLONE SODIUM SUCCINATE SCH MG: 125 INJECTION, POWDER, FOR SOLUTION INTRAMUSCULAR; INTRAVENOUS at 12:15

## 2022-07-23 RX ADMIN — INSULIN ASPART SCH UNIT: 100 INJECTION, SOLUTION INTRAVENOUS; SUBCUTANEOUS at 19:52

## 2022-07-23 RX ADMIN — BUDESONIDE AND FORMOTEROL FUMARATE DIHYDRATE SCH PUFF: 160; 4.5 AEROSOL RESPIRATORY (INHALATION) at 08:27

## 2022-07-23 RX ADMIN — INSULIN ASPART SCH UNIT: 100 INJECTION, SOLUTION INTRAVENOUS; SUBCUTANEOUS at 17:07

## 2022-07-23 RX ADMIN — PANTOPRAZOLE SODIUM SCH MG: 40 TABLET, DELAYED RELEASE ORAL at 06:43

## 2022-07-23 RX ADMIN — OXYCODONE HYDROCHLORIDE AND ACETAMINOPHEN PRN EACH: 10; 325 TABLET ORAL at 04:11

## 2022-07-23 RX ADMIN — CEFEPIME HYDROCHLORIDE SCH MLS/HR: 2 INJECTION, POWDER, FOR SOLUTION INTRAVENOUS at 19:53

## 2022-07-23 RX ADMIN — IPRATROPIUM BROMIDE AND ALBUTEROL SULFATE SCH ML: .5; 3 SOLUTION RESPIRATORY (INHALATION) at 08:27

## 2022-07-23 RX ADMIN — BUDESONIDE AND FORMOTEROL FUMARATE DIHYDRATE SCH PUFF: 160; 4.5 AEROSOL RESPIRATORY (INHALATION) at 20:13

## 2022-07-23 RX ADMIN — IPRATROPIUM BROMIDE AND ALBUTEROL SULFATE SCH ML: .5; 3 SOLUTION RESPIRATORY (INHALATION) at 20:13

## 2022-07-23 RX ADMIN — METOPROLOL SUCCINATE SCH MG: 50 TABLET, EXTENDED RELEASE ORAL at 09:06

## 2022-07-23 RX ADMIN — INSULIN ASPART SCH UNIT: 100 INJECTION, SOLUTION INTRAVENOUS; SUBCUTANEOUS at 07:02

## 2022-07-23 RX ADMIN — INSULIN ASPART SCH UNIT: 100 INJECTION, SOLUTION INTRAVENOUS; SUBCUTANEOUS at 12:15

## 2022-07-23 RX ADMIN — LACTULOSE SCH GM: 20 SOLUTION ORAL at 09:06

## 2022-07-23 RX ADMIN — IPRATROPIUM BROMIDE AND ALBUTEROL SULFATE SCH ML: .5; 3 SOLUTION RESPIRATORY (INHALATION) at 11:53

## 2022-07-23 RX ADMIN — METHYLPREDNISOLONE SODIUM SUCCINATE SCH MG: 125 INJECTION, POWDER, FOR SOLUTION INTRAMUSCULAR; INTRAVENOUS at 06:42

## 2022-07-23 NOTE — P.PN
Subjective


Progress Note Date: 07/23/22


Principal diagnosis: 


Bacteremia





Patient is a 76 year old  male with a recent diagnosis of lung cancer 

has not received any chemo presenting to the hospital with weakness unable to 

stand up or get out of the bed patient did have positive blood culture with 

gram-negative bacilli.


On today's evaluation that is 07/23/2022, the patient is afebrile, the patient 

complaining of weakness, the patient denies having any chest pain could have a 

cough with occasional sputum and hemoptysis denies any abdominal pain and did 

have some diarrhea








Objective





- Vital Signs


Vital signs: 


                                   Vital Signs











Temp  97.5 F L  07/23/22 12:00


 


Pulse  71   07/23/22 16:44


 


Resp  14   07/23/22 16:44


 


BP  127/61   07/23/22 12:00


 


Pulse Ox  97   07/23/22 16:32


 


FiO2  50   07/21/22 16:30








                                 Intake & Output











 07/22/22 07/23/22 07/23/22





 18:59 06:59 18:59


 


Intake Total   580


 


Output Total 


 


Balance -850 550 -620


 


Intake:   


 


  Intake, IV Titration   100





  Amount   


 


    Cefepime 2 gm In Sodium   100





    Chloride 0.9% 100 ml @ 25   





    mls/hr IVPB Q12HR [facility]   





    #:[id number]   


 


  Oral   480


 


Output:   


 


  Urine 


 


Other:   


 


  Voiding Method  Indwelling Catheter Indwelling Catheter














- Exam


GENERAL DESCRIPTION: An elderly male up in the chair in no distress





RESPIRATORY SYSTEM: Unlabored breathing , decreased breath sounds at bases





HEART: S1 S2 regular rate and rhythm ,





ABDOMEN: Soft , no tenderness





EXTREMITIES: No edema feet








- Labs


CBC & Chem 7: 


                                 07/22/22 07:58





                                 07/22/22 07:58


Labs: 


                  Abnormal Lab Results - Last 24 Hours (Table)











  07/22/22 07/22/22 07/23/22 Range/Units





  12:08 20:03 06:13 


 


POC Glucose (mg/dL)   209 H  256 H  ()  mg/dL


 


Procalcitonin  4.10 H    (0.02-0.09)  ng/mL














  07/23/22 07/23/22 Range/Units





  11:53 16:49 


 


POC Glucose (mg/dL)  218 H  253 H  ()  mg/dL


 


Procalcitonin    (0.02-0.09)  ng/mL








                      Microbiology - Last 24 Hours (Table)











 07/22/22 12:08 Blood Culture - Preliminary





 Blood    No Growth after 24 hours


 


 07/22/22 12:13 Blood Culture - Preliminary





 Blood    No Growth after 24 hours


 


 07/20/22 20:31 Gram Stain - Final





 Sputum Sputum Culture - Final


 


 07/20/22 18:02 Blood Culture Gram Stain - Preliminary





 Blood Blood Culture - Preliminary





    Staphylococcus epidermidis





    Gram Neg Bacilli





    Coagulase Negative Staph


 


 07/20/22 18:05 Blood Culture Gram Stain - Preliminary





 Blood Blood Culture - Preliminary





    Gram Neg Bacilli














Assessment and Plan


(1) Bacteremia


Current Visit: Yes   Status: Acute   Code(s): R78.81 - BACTEREMIA   SNOMED 

Code(s): 5836155


   





(2) Pneumonia


Current Visit: Yes   Status: Acute   Code(s): J18.9 - PNEUMONIA, UNSPECIFIED 

ORGANISM   SNOMED Code(s): 946040406


   


Plan: 


1patient with positive blood culture with staph epi likely skin contamination.


2patient with gram-negative bacteremia source could be likely pneumonia in this

patient do have underlying lung cancer and did have significant respiratory 

symptoms patient urine is negative abdominal was soft on examination.


3penicillin allergy that would limit number of antibiotics safe to use.


4patient to continue cefepime 2 g every 8 hour while waiting for cultures to 

finalize and blood cultures repeated so far negative





Time with Patient: Less than 30

## 2022-07-23 NOTE — CT
EXAMINATION TYPE: CT brain wo con

 

DATE OF EXAM: 7/23/2022

 

COMPARISON: None

 

HISTORY: CONFUSION

 

CT DLP: 1223.4 mGycm

Automated exposure control for dose reduction was used.

 

Images obtained of the brain with no contrast.

 

There is cerebral atrophy. There is no mass effect or midline shift. No sign of intracranial hemorrha
ge. Calvarium is intact. No evidence of cerebral edema. Spill base is intact. There is normal aeratio
n of the mastoid sinuses.

 

IMPRESSION:

Negative CT scan of the brain. Mild atrophy.

## 2022-07-23 NOTE — P.PN
Subjective


Progress Note Date: 07/23/22





76-year-old male patient, presented to the emergency feeling quite weak and 

debilitated.  The patient was becoming weaker, more somnolent and he was having 

ongoing issues with hemoptysis.  Note that the patient was noted to have a lung 

mass and the patient was supposed to have a outpatient bronchoscopy today.  The 

patient ended up checking himself to the hospital and he was admitted 

accordingly.  The patient is known to have a remote history of question 

sarcoidosis.  He is known to have COPD.  Is a known ex-smoker.  The patient had 

a CAT scan of the chest that was done on 06/10/2022.  A CAT scan was done and 

the patient was coughing of blood and he was having pain in his mid upper 

back/posterior chest area.  The patient was found to have a 6 x 5 x 5 cm right 

paramedline posterior mediastinal mass which was ill-defined in terms of its 

margin and was associated with a 2 cm right anterolateral direct invasion of the

T2 vertebral body.  The mass was abutting and anterior displacing the trachea.  

A PET scan was done and the patient was found to have intense metabolic uptake 

within the destructive mediastinal mass consistent with neoplasm.  Note that the

patient also has heart disease.  The patient undergone a previous TAVR procedure

for severe aortic stenosis.  The patient was also given a pacemaker following 

the procedure and this procedure was done back in 2020.  As chronic stage III ki

dney disease.  At this point in time, the patient is coming comfortable.  No 

cervical shortness of breath.  He is on 3 L of oxygen nasal cannula with a pulse

ox of 98% and a chest x-ray was done today showed patchy by the pulmonary 

infiltrates with marked soft tissue prominence in the right paratracheal region 

consistent with a mediastinal mass.  The patient's white cell count was at 23 

with a hemoglobin of 12, normal correlation profile, creatinine of 1.7 with a 

mean of 35.  ProBNP level was 606 140 with a minimal troponin leak of 0.119.  

EKG was showing a paced rhythm.





The patient is seen today 07/22/2022 in follow-up on the selective care unit.  

He is currently awake and alert in no acute distress.  He has a loose 

nonproductive cough.  Maintaining saturations in the mid 90s on 3 L/m per nasal 

cannula.  He has some complaints of upper extremity weakness.  Blood culture is 

positive for gram-negative bacilli sputum culture pending.  White count 17.0.  

Hemoglobin 11.8.  Sodium 133.  Potassium 4.5.  BUN 4.  Creatinine 1.47.  Glucose

150.  He did undergo EBUS bronchoscopy with FNA of the superior mediastinal 

mass.  Pathology is pending.  He remains on DuoNeb inhalations, Symbicort, a

ntibiotics in the form of ceftriaxone. 





On today's evaluation of 07/23/2022, the patient seems to be in a very bad 

moods.  He feels that he is not going to recover from his condition.  He 

continues to have pain in his upper back and some ongoing weakness in upper 

extremities bilaterally.  He is able to ambulate.  Note that the patient has a 

mediastinal mass that was biopsied and the results are still pending for now.  

Meanwhile, CAT scan of the C-spine showed a large posterior mediastinal mass 

with involvement of the upper thoracic vertebral bodies and pathologic 

fracture/compression fracture of the T3 spine.  There is also evidence of tumor 

extension into the spinal canal with 50% narrowing.  MRI was not performed as 

the patient's pacemaker is not compatible with MRI machine.  CAT scan of the 

brain was essentially negative.  The pathologic results from the EBUS and needle

aspirate is still pending for now.  He is still covered with a common issue 

bronchodilators and steroids.  He is also on broad-spectrum antibiotics.  No 

blood work from today.  Blood sugar slightly elevated related to his steroid 

intake.  The pro calcitonin level is at 4.1.  Creatinine is 1.47 from yesterday 

.  At the same time, the patient's blood cultures positive for gram-negative 

bacillus and the patient is currently on IV cefepime.  The patient is 

bacteremic.  Could be related to pneumonia.  Final cultures sensitivities are 

still pending for now.





Objective





- Vital Signs


Vital signs: 


                                   Vital Signs











Temp  97.4 F L  07/23/22 08:00


 


Pulse  72   07/23/22 08:43


 


Resp  20   07/23/22 08:00


 


BP  123/69   07/23/22 08:00


 


Pulse Ox  96   07/23/22 08:29


 


FiO2  50   07/21/22 16:30








                                 Intake & Output











 07/22/22 07/23/22 07/23/22





 18:59 06:59 18:59


 


Intake Total   240


 


Output Total 850 550 


 


Balance -850 -550 240


 


Intake:   


 


  Oral   240


 


Output:   


 


  Urine 850 550 


 


Other:   


 


  Voiding Method  Indwelling Catheter Indwelling Catheter














- Exam





Gen. appearance: A pleasant 76-year-old male patient, on 3 L nasal cannula, the 

patient is comfortable no acute distress, is complaining of some upper extremity

weakness


Head exam was generally normal. There was no scleral icterus or corneal arcus. 

Mucous membranes were moist.


Neck was supple and without jugular venous distension, thyromegaly, or carotid 

bruits. Carotids were easily palpable bilaterally. There was no adenopathy.


Lungs sounds are diminished and patient has scattered rhonchi and scattered 

expiratory wheezes without the lung his bilaterally and the patient has a 

pacemaker pocket over the left anterior chest area.


Cardiac exam revealed the PMI to be normally situated and sized. The rhythm was 

regular and no extrasystoles were noted during several minutes of auscultation. 

The first and second heart sounds were normal and physiologic splitting of the 

second heart sound was noted. There was a systolic ejection murmur grade 3/6 in 

the precordium, rubs, clicks, or gallops.


Abdominal exam revealed normal bowel sounds. The abdomen was soft, non-tender, 

and without masses, organomegaly, or appreciable enlargement of the abdominal 

aorta.


Examination of the extremities revealed easily palpable radial, femoral and 

pedal pulses. There was no cyanosis, clubbing or edema.


Examination of the skin revealed no evidence of significant rashes, suspicious 

appearing nevi or other concerning lesions.





- Labs


CBC & Chem 7: 


                                 07/22/22 07:58





                                 07/22/22 07:58


Labs: 


                  Abnormal Lab Results - Last 24 Hours (Table)











  07/22/22 07/22/22 07/23/22 Range/Units





  12:08 20:03 06:13 


 


POC Glucose (mg/dL)   209 H  256 H  ()  mg/dL


 


Procalcitonin  4.10 H    (0.02-0.09)  ng/mL








                      Microbiology - Last 24 Hours (Table)











 07/20/22 20:31 Gram Stain - Final





 Sputum Sputum Culture - Final


 


 07/20/22 18:02 Blood Culture Gram Stain - Preliminary





 Blood Blood Culture - Preliminary





    Staphylococcus epidermidis





    Gram Neg Bacilli





    Coagulase Negative Staph


 


 07/20/22 18:05 Blood Culture Gram Stain - Preliminary





 Blood Blood Culture - Preliminary





    Gram Neg Bacilli














Assessment and Plan


Assessment: 





Hemoptysis, no evidence of endobronchial lesion or bleeding, bronchoscopy 

revealed no evidence of any tracheal or endobronchial tumors.  The patient 

however has a large posterior superior mediastinal mass





Mediastinal mass.  The patient is a 6 x 5 x 5 cm posterior superior mediastinal 

mass, with direct anterolateral invasion of the T2 vertebral body, highly active

on PET scan and highly suspicious for tracheal wall invasion posteriorly.  This 

is most likely an underlying malignancy.  Status post EBUS bronchoscopy with FNA

of the mediastinal mass.  Pathology pending





Pathologic fracture of the T3 spine and evidence of spine compression in the 

order of 50%.  Please refer to the CAT scan of the cervical spine.  The patient 

has motor weakness in the upper extremities.  The patient has no motor weakness 

in lower extremities.  The patient is experiencing back pain.





Bacteremia secondary to gram-negative bacilli, awaiting further cultures.  The 

pro-calcitonin level is also elevated and the patient currently remains on IV 

cefepime.





Back pain secondary to above with some upper extremity weakness





COPD





History aortic valve stenosis with a previous TAVR and a previous pacemaker inse

rtion.  Echocardiogram from May 2022 showed preserved LV, bioprosthetic aortic 

valve which is in place and no significant leaks





Chronic stage III kidney disease





History of sarcoidosis which is currently inactive in stable





Troponin leak, nonspecific





Acute leukocytosis





Plan:





The prognosis extremely poor


We'll consult spine surgery regarding the pathologic T3 fracture


We will consult radiation oncology


Awaiting the results of the EBUS and transbronchial needle aspirate of the 

posterior mediastinal mass


Continue IV cefepime


The patient has gram-negative bacilli in the blood awaiting final cultures and 

sensitivities


Continue bronchodilators and steroids


Unable to do an MRI of the spine because of pacemaker and incompatibility


A poor prognosis based on above.  We'll continue to follow.

## 2022-07-23 NOTE — P.CNNES
History of Present Illness


Consult date: 07/23/22


Requesting physician: Juno Wells


Reason for Consult: lethargy and confusion


History of Present Illness: 


This is a 76-year-old gentleman with history of sarcoidosis, chronic stage III 

kidney disease, aortic valve stenosis with previous TAVR and pacemaker, COPD who

presented emergency department with generalized weakness, hemoptysis and 

somnolent.  Neurology is consulted for lethargy and confusion.  Some of the 

history is obtained from his wife and nurse.  Per the nurse yesterday at night 

it seems he was confused but today no confusion  The patient stated he is not 

confused and neither did his wife.  The patient had CT scan on 06/10/2022 of 

chest noted to have a lung mass was supposed to get outpatient bronchoscopy as 

an outpatient but because of his symptoms he came to the hospital.  It seems the

patient was found to have 6 x 5 x 5 cm right paramedian posterior mediastinal 

mass that was ill-defined in terms of its margins associate with 2 cm right 

anterior lateral direct invasion of the T2 vertebral body.  A PET scan was done 

and the patient was found to have intense metabolic uptake within the destruc

tive mediastinal mass consistent with neoplasm.  His blood cultures were 

positive for gram-negative bacilli sputum.  He did undergo EBUS bronchoscopy 

with FNA of superior mediastinal mass.  Pathology is pending.  He is currently 

on Ceftriaxone.


According to the patient nurse the patient has been somnolent lethargic and the 

with verbal stimuli he'll be awake responding the needle go back to sleep.  No 

seizure-like activity is noted.





Some of the other workup during his hospital visit consisted of:


CT of the head is reported as negative CT scan of the brain.  Mild atrophy.  I 

personally reviewed the CT head and I agree with the report.


The cervical and thoracic spine is reported as large posterior mediastinal mass 

with involvement of the upper thoracic vertebral bodies and pathological compr

ession fracture of T3.  There is evidence for tumor extension into the spinal 

canal 50% narrowing.  MRI scan would be helpful for degenerative evaluation of 

the spinal canal.  Bone destruction has progressed compared to the recent CT 

scan on the 07/08/2022


Initial white blood cell is 23,000 and currently is 17,000 at predominantly 

neutrophilic


Initial creatinine is 1.72 and BUN is 35 and most current


Calcium is 10.0


AST 16 ALT is the 11.


Corona virus PCR was not detected








Review of Systems


Review of system:  The 12 point system was reviewed and apparent positive and 

negative per HPI.








Past Medical History


Past Medical History: Asthma, Cancer, Chest Pain / Angina, GERD/Reflux, Hearing 

Disorder / Deafness, Hyperlipidemia, Hypertension, Osteoarthritis (OA), Prostate

Disorder, Renal Disease, Respiratory Disorder


Additional Past Medical History / Comment(s): SKIN CA LT ARM.  HEART MURMUR, hx 

AORTIC STENOSIS.  Varicose Veins. Hx Sarcoidosis, back pain, tumor in lung, 

hiatal hernia, constipation, 'spot on pancreas", enlarged prostate, "stage III 

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


History of Any Multi-Drug Resistant Organisms: MRSA


Date of last positivie culture/infection: 2010


MDRO Source:: sinus


Past Surgical History: Cardiac Valve Replacement, Cholecystectomy, Heart 

Catheterization, Pacemaker, Prostate Surgery


Additional Past Surgical History / Comment(s): Septoplasty.  Colonoscopy.  NECK 

GLAND BIOPSY.  BRONCH, LUNG biopsy. TVAR aortic valve replacement 2020, skin 

cancer removed left arm,


Past Anesthesia/Blood Transfusion Reactions: No Reported Reaction


Type of Cardiac Device: Permanent Pacemaker


Device Placement Date:: 2020 [employer]


Past Psychological History: No Psychological Hx Reported


Smoking Status: Former smoker


Past Alcohol Use History: None Reported


Additional Past Alcohol Use History / Comment(s): STARTED SMOKING AT AGE 16, 

QUIT IN 1974, SMOKED 3PPD


Past Drug Use History: None Reported





- Past Family History


  ** Sister(s)


Family Medical History: Deep Vein Thrombosis (DVT)


Additional Family Medical History / Comment(s): .





  ** Mother


Family Medical History: Cancer


Additional Family Medical History / Comment(s): BRAIN CA, COLON CA





Medications and Allergies


                                Home Medications











 Medication  Instructions  Recorded  Confirmed  Type


 


Budesonide-Formot 160-4.5 Mcg 2 puff INHALATION RT-BID 01/02/18 07/20/22 History





[Symbicort 160-4.5 Mcg Inhaler]    


 


amLODIPine BESYLATE 5 mg PO DAILY 02/28/19 07/20/22 History


 


Turmeric Root Extract [Turmeric] 500 mg PO DAILY 03/17/20 07/20/22 History


 


Apixaban [Eliquis] 5 mg PO BID 05/23/22 07/20/22 History


 


Atorvastatin Calcium [Lipitor] 40 mg PO HS 05/23/22 07/20/22 History


 


Metoprolol Succinate [Toprol XL] 50 mg PO DAILY 05/23/22 07/20/22 History


 


Omeprazole 20 mg PO BID 05/23/22 07/20/22 History


 


Tart Cherry Concentrate 1 dose PO DAILY PRN 05/23/22 07/20/22 History


 


Torsemide [Demadex] 20 mg PO DAILY 05/23/22 07/20/22 History


 


Losartan Potassium [Cozaar] 50 mg PO DAILY 30 Days #15 tab 05/24/22 07/20/22 Rx


 


Albuterol Inhaler [Ventolin Hfa 1 - 2 puff INHALATION RT-Q6H PRN 07/19/22 07/20/22 History





Inhaler]    


 


Lactulose 10 gm PO DAILY 07/19/22 07/20/22 History


 


fentaNYL [Duragesic 37.5 MCG/HR] 1 patch TRANSDERM Q72H 07/19/22 07/20/22 

History


 


oxyCODONE-APAP 10-325MG [Percocet 1 tab PO Q6HR 07/19/22 07/20/22 History





 mg]    


 


Acetaminophen Tab [Tylenol Tab] 1,000 mg PO Q6H PRN 07/20/22 07/20/22 History








                                    Allergies











Allergy/AdvReac Type Severity Reaction Status Date / Time


 


amoxicillin trihydrate Allergy  Rash/Hives Verified 07/20/22 17:30





[From Augmentin]     


 


clindamycin Allergy  Rash/Hives Verified 07/20/22 17:30


 


ibuprofen Allergy  Rash/Hives Verified 07/20/22 17:30


 


potassium clavulanate Allergy  Rash/Hives Verified 07/20/22 17:30





[From Augmentin]     


 


ragweed pollen AdvReac  SINUS Verified 07/20/22 17:30





   PROBLEMS  














Physical Examination





- Vital Signs


Vital Signs: 


                                   Vital Signs











  Temp Pulse Pulse Resp BP BP Pulse Ox


 


 07/23/22 08:43   72     


 


 07/23/22 08:29   78      96


 


 07/23/22 08:00  97.4 F L   76  20  123/69   95


 


 07/23/22 04:00  97.4 F L   70  20  136/73   95


 


 07/23/22 00:00  97.5 F L   68  20  123/70   96


 


 07/22/22 20:00  97.5 F L   91  24  120/69   96


 


 07/22/22 19:56   70     


 


 07/22/22 19:41   68     


 


 07/22/22 18:00        95


 


 07/22/22 16:00    62  18   120/69  95


 


 07/22/22 15:56   70     


 


 07/22/22 15:45   72     


 


 07/22/22 14:00    60  18   


 


 07/22/22 12:00    60  18   106/60  95








                                Intake and Output











 07/22/22 07/23/22 07/23/22





 22:59 06:59 14:59


 


Intake Total   240


 


Output Total 700 550 


 


Balance -700 -550 240


 


Intake:   


 


  Oral   240


 


Output:   


 


  Urine 700 550 


 


Other:   


 


  Voiding Method Indwelling Catheter Indwelling Catheter Indwelling Catheter











GENERAL: The patient is sitting in a recliner chair not in acute distress.


CHEST: The heart rate is regular rate rhythm.   No murmurs to auscultation.  


LUNG: Clear to auscultation bilaterally no wheezing noted throughout.  Not 

labored breathing.  


ABDOMEN/GI: Bowel sounds present in all 4 quadrants. No tenderness to palpation 

throughout.


RESPIRATORY: Has wheeze and rhonci


Cardiac: +ve murmur.  Swelling lowers.


INTEGUMENTARY: No discoloration of skin in lowers.





NEUROLOGICAL:


Higher mental function: The patient is awake, alert, oriented to self, place and

time.  Patient is following commands.  No aphasia and no neglect.


Cranial nerves: The pupils are round, equal and reactive to light and 

accommodation.  Visual fields are full to confrontation throughout.  Extraocular

movement is intact no nystagmus is noted.  Facial sensation is normal to touch 

throughout.  The facial strength is normal throughout.  Hearing is moderately 

decreased bilaterally to hand rub.  Tongue is midline and moved side-to-side 

without any difficulty.  No dysarthria is noted.  Shoulder shrug is normal 

bilaterally.


Motor: The strength is 5 over 5 throughout.  Normal tone and bulk.  


Cerebellum: Normal finger to nose bilaterally.


Sensation: Sensation is normal to touch throughout.


Reflexes (right/left): 1+ throughout.


Plantars are mute bilaterally. 








Results





- Laboratory Findings


CBC and BMP: 


                                 07/22/22 07:58





                                 07/22/22 07:58


Abnormal Lab Findings: 


                                  Abnormal Labs











  07/20/22 07/20/22 07/20/22





  14:07 14:07 14:07


 


WBC  23.0 H  


 


RBC  4.07 L  


 


Hgb  12.0 L  


 


Hct  37.2 L  


 


MCHC   


 


Neutrophils #  21.5 H  


 


Lymphocytes #  0.5 L  


 


PT   12.1 H 


 


APTT   30.2 H 


 


Sodium   


 


Chloride   


 


Carbon Dioxide   


 


BUN   


 


Creatinine   


 


Glucose   


 


POC Glucose (mg/dL)   


 


AST   


 


Troponin I   


 


Total Protein   


 


Albumin   


 


Procalcitonin   


 


Urine Protein    Trace H


 


Urine Blood    Small H


 


Urine RBC    12 H


 


Urine Bacteria    Rare H


 


Urine Mucus    Rare H














  07/20/22 07/20/22 07/21/22





  14:07 14:07 14:03


 


WBC    19.0 H


 


RBC   


 


Hgb    12.9 L


 


Hct   


 


MCHC   


 


Neutrophils #    17.3 H


 


Lymphocytes #    0.6 L


 


PT   


 


APTT   


 


Sodium  131 L  


 


Chloride  92 L  


 


Carbon Dioxide  33 H  


 


BUN  35 H  


 


Creatinine  1.72 H  


 


Glucose  132 H  


 


POC Glucose (mg/dL)   


 


AST  16 L  


 


Troponin I   0.119 H* 


 


Total Protein  4.9 L  


 


Albumin  2.7 L  


 


Procalcitonin   


 


Urine Protein   


 


Urine Blood   


 


Urine RBC   


 


Urine Bacteria   


 


Urine Mucus   














  07/21/22 07/22/22 07/22/22





  14:03 07:58 07:58


 


WBC   17.0 H 


 


RBC   4.17 L 


 


Hgb   11.8 L 


 


Hct   


 


MCHC   30.3 L 


 


Neutrophils #   15.6 H 


 


Lymphocytes #   0.5 L 


 


PT   


 


APTT   


 


Sodium  132 L   133 L


 


Chloride  94 L   94 L


 


Carbon Dioxide  34 H   34 H


 


BUN  45 H   49 H


 


Creatinine  1.60 H   1.47 H


 


Glucose  126 H   150 H


 


POC Glucose (mg/dL)   


 


AST   


 


Troponin I   


 


Total Protein   


 


Albumin   


 


Procalcitonin   


 


Urine Protein   


 


Urine Blood   


 


Urine RBC   


 


Urine Bacteria   


 


Urine Mucus   














  07/22/22 07/22/22 07/23/22





  12:08 20:03 06:13


 


WBC   


 


RBC   


 


Hgb   


 


Hct   


 


MCHC   


 


Neutrophils #   


 


Lymphocytes #   


 


PT   


 


APTT   


 


Sodium   


 


Chloride   


 


Carbon Dioxide   


 


BUN   


 


Creatinine   


 


Glucose   


 


POC Glucose (mg/dL)   209 H  256 H


 


AST   


 


Troponin I   


 


Total Protein   


 


Albumin   


 


Procalcitonin  4.10 H  


 


Urine Protein   


 


Urine Blood   


 


Urine RBC   


 


Urine Bacteria   


 


Urine Mucus   














Assessment and Plan


Assessment: 





Delerium: Possibly due to multifactorial: Hospital induced, medications 

(steroids) and has sun downing--currently oriented X4 and no focal deficits.


Mediastinal mass in the superior region concerning for underlying malignancy.  

Has tracheal wall invasion and invasion of T2 vertebral body.  Had bronchoscopy 

and pending pathology.


Back pain due to above


Bacteremia secondary due to gram-negative bacilli


Hemoptysis due to the lung mass.


acute on chronic stage III disease--trending down


History of sarcoidosis


History of aortic valve stenosis with previous TAVR and pacemaker


COPD





Plan: 





I spoke with the patient and his wife and they don't feel the patient is 

confused and currently the patient does not have any confusion.  If he does have

any further confusion will consider getting routine EEG.  For now the patient's 

wife do not want to pursue with the EEG.  We'll hold off on getting any 

additional lab tests as well.


Cannot obtain MRI of the brain since the patient has a pacemaker


Infection disease is on board


Pulmonary team is on board


Orthopedic team is on board for T3 distraction


We'll defer the rest of the medical measure the primary team








The plan is discussed with the patient, his wife was at bedside and his nurse.


Thank you for the consultation.





Robert Patel M.D.


[facility]





Time with Patient: Greater than 30

## 2022-07-24 LAB
GLUCOSE BLD-MCNC: 163 MG/DL (ref 70–110)
GLUCOSE BLD-MCNC: 188 MG/DL (ref 70–110)
GLUCOSE BLD-MCNC: 206 MG/DL (ref 70–110)
GLUCOSE BLD-MCNC: 217 MG/DL (ref 70–110)

## 2022-07-24 RX ADMIN — BUDESONIDE AND FORMOTEROL FUMARATE DIHYDRATE SCH PUFF: 160; 4.5 AEROSOL RESPIRATORY (INHALATION) at 07:57

## 2022-07-24 RX ADMIN — METHYLPREDNISOLONE SODIUM SUCCINATE SCH MG: 125 INJECTION, POWDER, FOR SOLUTION INTRAMUSCULAR; INTRAVENOUS at 12:00

## 2022-07-24 RX ADMIN — CEFEPIME HYDROCHLORIDE SCH MLS/HR: 2 INJECTION, POWDER, FOR SOLUTION INTRAVENOUS at 20:21

## 2022-07-24 RX ADMIN — IPRATROPIUM BROMIDE AND ALBUTEROL SULFATE SCH ML: .5; 3 SOLUTION RESPIRATORY (INHALATION) at 07:57

## 2022-07-24 RX ADMIN — METHYLPREDNISOLONE SODIUM SUCCINATE SCH MG: 125 INJECTION, POWDER, FOR SOLUTION INTRAMUSCULAR; INTRAVENOUS at 23:18

## 2022-07-24 RX ADMIN — ATORVASTATIN CALCIUM SCH MG: 40 TABLET, FILM COATED ORAL at 20:20

## 2022-07-24 RX ADMIN — INSULIN ASPART SCH UNIT: 100 INJECTION, SOLUTION INTRAVENOUS; SUBCUTANEOUS at 06:43

## 2022-07-24 RX ADMIN — INSULIN ASPART SCH UNIT: 100 INJECTION, SOLUTION INTRAVENOUS; SUBCUTANEOUS at 11:59

## 2022-07-24 RX ADMIN — BUDESONIDE AND FORMOTEROL FUMARATE DIHYDRATE SCH PUFF: 160; 4.5 AEROSOL RESPIRATORY (INHALATION) at 19:25

## 2022-07-24 RX ADMIN — METOPROLOL SUCCINATE SCH MG: 50 TABLET, EXTENDED RELEASE ORAL at 09:11

## 2022-07-24 RX ADMIN — IPRATROPIUM BROMIDE AND ALBUTEROL SULFATE SCH: .5; 3 SOLUTION RESPIRATORY (INHALATION) at 15:02

## 2022-07-24 RX ADMIN — IPRATROPIUM BROMIDE AND ALBUTEROL SULFATE SCH ML: .5; 3 SOLUTION RESPIRATORY (INHALATION) at 10:59

## 2022-07-24 RX ADMIN — INSULIN ASPART SCH UNIT: 100 INJECTION, SOLUTION INTRAVENOUS; SUBCUTANEOUS at 17:28

## 2022-07-24 RX ADMIN — METHYLPREDNISOLONE SODIUM SUCCINATE SCH MG: 125 INJECTION, POWDER, FOR SOLUTION INTRAMUSCULAR; INTRAVENOUS at 06:45

## 2022-07-24 RX ADMIN — CEFEPIME HYDROCHLORIDE SCH MLS/HR: 2 INJECTION, POWDER, FOR SOLUTION INTRAVENOUS at 09:11

## 2022-07-24 RX ADMIN — METHYLPREDNISOLONE SODIUM SUCCINATE SCH MG: 125 INJECTION, POWDER, FOR SOLUTION INTRAMUSCULAR; INTRAVENOUS at 17:29

## 2022-07-24 RX ADMIN — INSULIN ASPART SCH UNIT: 100 INJECTION, SOLUTION INTRAVENOUS; SUBCUTANEOUS at 20:19

## 2022-07-24 RX ADMIN — LACTULOSE SCH GM: 20 SOLUTION ORAL at 09:11

## 2022-07-24 RX ADMIN — IPRATROPIUM BROMIDE AND ALBUTEROL SULFATE SCH: .5; 3 SOLUTION RESPIRATORY (INHALATION) at 19:25

## 2022-07-24 RX ADMIN — PANTOPRAZOLE SODIUM SCH MG: 40 TABLET, DELAYED RELEASE ORAL at 06:43
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
## 2022-07-24 NOTE — P.CNOR
History of Present Illness





- [facility]


Consult date: 07/24/22


Requesting physician: Miriam Ba


Consult reason: other (Mediastinal mass with destructive lesions involving T2, 

T3, and T4 )


History of present illness: 





Patient is a very pleasant 76-year-old male who is seen and examined at bedside 

for further evaluation of his thoracic spine after CT imaging showed evidence of

destructive lesions involving T2, T3, and T4 with expansion to the spinal cord 

at T3.  Patient was just recently diagnosed with a large posterior mediastinal m

ass which involves the upper thoracic vertebral bodies as well as the esophagus.

 Patient just became aware of the significance of the large posterior mass 

yesterday.  





Patient states at the bedside his pain is most significant at the upper thoracic

spine posteriorly.  He does mid to some difficulty with ambulation mobilization.

 He feels generally weak throughout his body.  He states he has had difficulty 

with mobility for several months.  He is not currently complaining of any 

significant lower extremity pain.





Patient is being seen by multiple medical providers including medicine, infectio

us disease, neurology and pulmonology.  Patient has multiple significant medical

diagnoses.  Patient has a history of coughing up blood over the past 4 months.  

He has undergone a bronchoscopy with pathology reports pending.  He is being 

seen and treated by infectious disease as well for bacteremia.  Consultation has

been placed with interventional radiology.





Patient has a history of aortic valve stenosis with previous TVAR and pacemaker 

insertion and is unable to undergo MRI imaging.





Past Medical History


Past Medical History: Asthma, Cancer, Chest Pain / Angina, GERD/Reflux, Hearing 

Disorder / Deafness, Hyperlipidemia, Hypertension, Osteoarthritis (OA), Prostate

Disorder, Renal Disease, Respiratory Disorder


Additional Past Medical History / Comment(s): SKIN CA LT ARM.  HEART MURMUR, hx 

AORTIC STENOSIS.  Varicose Veins. Hx Sarcoidosis, back pain, tumor in lung, 

hiatal hernia, constipation, 'spot on pancreas", enlarged prostate, "stage III 

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


History of Any Multi-Drug Resistant Organisms: MRSA


Year Discovered:: 2010


MDRO Source:: sinus


Past Surgical History: Cardiac Valve Replacement, Cholecystectomy, Heart 

Catheterization, Pacemaker, Prostate Surgery


Additional Past Surgical History / Comment(s): Septoplasty.  Colonoscopy.  NECK 

GLAND BIOPSY.  BRONCH, LUNG biopsy. TVAR aortic valve replacement 2020, skin 

cancer removed left arm,


Past Anesthesia/Blood Transfusion Reactions: No Reported Reaction


Type of Cardiac Device: Permanent Pacemaker


Device Placement Date:: 2020 [employer]


Past Psychological History: No Psychological Hx Reported


Smoking Status: Former smoker


Past Alcohol Use History: None Reported


Additional Past Alcohol Use History / Comment(s): STARTED SMOKING AT AGE 16, 

QUIT IN 1974, SMOKED 3PPD


Past Drug Use History: None Reported





- Past Family History


  ** Sister(s)


Family Medical History: Deep Vein Thrombosis (DVT)


Additional Family Medical History / Comment(s): .





  ** Mother


Family Medical History: Cancer


Additional Family Medical History / Comment(s): BRAIN CA, COLON CA





Medications and Allergies


                                Home Medications











 Medication  Instructions  Recorded  Confirmed  Type


 


Budesonide-Formot 160-4.5 Mcg 2 puff INHALATION RT-BID 01/02/18 07/20/22 History





[Symbicort 160-4.5 Mcg Inhaler]    


 


amLODIPine BESYLATE 5 mg PO DAILY 02/28/19 07/20/22 History


 


Turmeric Root Extract [Turmeric] 500 mg PO DAILY 03/17/20 07/20/22 History


 


Apixaban [Eliquis] 5 mg PO BID 05/23/22 07/20/22 History


 


Atorvastatin Calcium [Lipitor] 40 mg PO HS 05/23/22 07/20/22 History


 


Metoprolol Succinate [Toprol XL] 50 mg PO DAILY 05/23/22 07/20/22 History


 


Omeprazole 20 mg PO BID 05/23/22 07/20/22 History


 


Tart Cherry Concentrate 1 dose PO DAILY PRN 05/23/22 07/20/22 History


 


Torsemide [Demadex] 20 mg PO DAILY 05/23/22 07/20/22 History


 


Losartan Potassium [Cozaar] 50 mg PO DAILY 30 Days #15 tab 05/24/22 07/20/22 Rx


 


Albuterol Inhaler [Ventolin Hfa 1 - 2 puff INHALATION RT-Q6H PRN 07/19/22 07/20/22 History





Inhaler]    


 


Lactulose 10 gm PO DAILY 07/19/22 07/20/22 History


 


fentaNYL [Duragesic 37.5 MCG/HR] 1 patch TRANSDERM Q72H 07/19/22 07/20/22 

History


 


oxyCODONE-APAP 10-325MG [Percocet 1 tab PO Q6HR 07/19/22 07/20/22 History





 mg]    


 


Acetaminophen Tab [Tylenol Tab] 1,000 mg PO Q6H PRN 07/20/22 07/20/22 History








                                    Allergies











Allergy/AdvReac Type Severity Reaction Status Date / Time


 


amoxicillin trihydrate Allergy  Rash/Hives Verified 07/20/22 17:30





[From Augmentin]     


 


clindamycin Allergy  Rash/Hives Verified 07/20/22 17:30


 


ibuprofen Allergy  Rash/Hives Verified 07/20/22 17:30


 


potassium clavulanate Allergy  Rash/Hives Verified 07/20/22 17:30





[From Augmentin]     


 


ragweed pollen AdvReac  SINUS Verified 07/20/22 17:30





   PROBLEMS  














Physical Examination





Physical exam:





Patient is somewhat sleepy but is arousable and will answer questions


Vital signs stable


Good chest excursion with deep inspiration and expiration


Examination of posterior cervical, thoracic, and lumbar spine reveals skin is 

intact with no abrasions, lacerations, or bruises; no erythema, purulence or 

signs of infection


Some pain with palpation along the midline of the upper thoracic spine


Soft palpable mass to the left of the midline of the upper thoracic spine


Mild pain with palpation along the midline of the lumbar spine


Dorsiflexion, plantarflexion, and extensor hallucis longus positive sustained 

bilaterally


Patient has difficulty lifting his left lower extremities off the bed


No lower extremity hyperreflexia bilaterally


Negative Lasegue's test bilaterally


No signs or symptoms of DVT; no calf pain


No pain with internal and external rotation of the hips bilaterally


Neurovascularly intact





Results





Pertinent studies:





CT of the cervical spine and thoracic spine taken on 07/22/2022: Large posterior

mediastinal mass with involvement of the upper thoracic vertebral bodies with 

apparent tumor extension into the spinal canal with approximately 50% narrowing 

with evidence of involvement of the esophagus with anterior displacement of 

esophagus with destructive lesions involving T2, T3, and T4 vertebral bodies 

anteriorly; extensive infiltrate in the right lung with pleural thickening with 

irregular right pleural effusion





- Labs


Labs: 


                  Abnormal Lab Results - Last 24 Hours (Table)











  07/23/22 07/23/22 07/23/22 Range/Units





  11:53 16:49 19:50 


 


POC Glucose (mg/dL)  218 H  253 H  202 H  ()  mg/dL














  07/24/22 07/24/22 Range/Units





  05:56 11:36 


 


POC Glucose (mg/dL)  217 H  206 H  ()  mg/dL








                      Microbiology - Last 24 Hours (Table)











 07/20/22 18:05 Blood Culture Gram Stain - Final





 Blood Blood Culture - Final





    Pasteurella multocida


 


 07/20/22 18:02 Blood Culture Gram Stain - Final





 Blood Blood Culture - Final





    Staphylococcus epidermidis





    Pasteurella multocida





    Coagulase Negative Staph


 


 07/22/22 12:08 Blood Culture - Preliminary





 Blood    No Growth after 24 hours


 


 07/22/22 12:13 Blood Culture - Preliminary





 Blood    No Growth after 24 hours


 


 07/20/22 20:31 Gram Stain - Final





 Sputum Sputum Culture - Final








                                      H & H











  07/20/22 07/21/22 07/22/22 Range/Units





  14:07 14:03 07:58 


 


Hgb  12.0 L  12.9 L  11.8 L  (13.0-17.5)  gm/dL


 


Hct  37.2 L  41.7  39.0  (39.0-53.0)  %








                                   Coagulation











  07/20/22 Range/Units





  14:07 


 


INR  1.1  (<1.2)  











Result Diagrams: 


                                 07/22/22 07:58





                                 07/22/22 07:58





Assessment and Plan


Assessment: 





Assessment:





Upper thoracic back pain


Low back pain


Destructive lesions involving T2, T3, and T4 


Apparent tumor extension into the spinal canal at T3 with approximately 50% 

narrowing


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 

esophagus


Hemoptysis


Bacteremia


COPD


History of aortic valve stenosis with previous TVAR and pacemaker insertion


Acute leukocytosis


History of sarcoidosis


Chronic stage III kidney disease


(1) Mass of mediastinum


Current Visit: Yes   Status: Acute   Code(s): J98.59 - OTHER DISEASES OF 

MEDIASTINUM, NOT ELSEWHERE CLASSIFIED   SNOMED Code(s): 81697959


   





(2) Thoracic back pain


Current Visit: Yes   Status: Acute   Code(s): M54.6 - PAIN IN THORACIC SPINE   

SNOMED Code(s): 660084144


   





(3) Low back pain


Current Visit: Yes   Status: Acute   Code(s): M54.50 - LOW BACK PAIN, 

UNSPECIFIED   SNOMED Code(s): 170912422


   





(4) Bone destruction


Current Visit: Yes   Status: Acute   Code(s): M89.8X9 - OTHER SPECIFIED 

DISORDERS OF BONE, UNSPECIFIED SITE   SNOMED Code(s): 97269040


   





(5) Hemoptysis


Current Visit: Yes   Status: Acute   Code(s): R04.2 - HEMOPTYSIS   SNOMED 

Code(s): 22712835


   





(6) COPD (chronic obstructive pulmonary disease)


Current Visit: Yes   Status: Acute   Code(s): J44.9 - CHRONIC OBSTRUCTIVE 

PULMONARY DISEASE, UNSPECIFIED   SNOMED Code(s): 74105042


   





(7) Leukocytosis


Current Visit: Yes   Status: Acute   Code(s): D72.829 - ELEVATED WHITE BLOOD 

CELL COUNT, UNSPECIFIED   SNOMED Code(s): 481279220


   





(8) History of sarcoidosis


Current Visit: Yes   Status: Acute   Code(s): Z86.2 - PRSNL HISTORY OF DIS OF TH

E BLD/BLD-FORM ORG/IMMUN [facility]   SNOMED Code(s): 225327117


   





(9) Chronic kidney disease, stage III (moderate)


Current Visit: Yes   Status: Acute   Code(s): N18.30 - CHRONIC KIDNEY DISEASE, 

STAGE 3 UNSPECIFIED   SNOMED Code(s): 265936497


   





(10) Pacemaker


Current Visit: Yes   Status: Acute   Code(s): Z95.0 - PRESENCE OF CARDIAC 

PACEMAKER   SNOMED Code(s): 869612263


   





(11) History of aortic valve stenosis


Current Visit: Yes   Status: Acute   Code(s): Z86.79 - PERSONAL HISTORY OF OTHER

DISEASES OF THE CIRCULATORY SYSTEM   SNOMED Code(s): 851765787


   





(12) Bacteremia


Current Visit: Yes   Status: Acute   Code(s): R78.81 - BACTEREMIA   SNOMED 

Code(s): 2579021


   


Plan: 





Plan:





1. Large posterior mediastinal mass with involvement of the upper thoracic 

vertebral bodies with apparent tumor extension into the spinal canal with 

approximately 50% narrowing with evidence of involvement of the esophagus with 

anterior displacement of esophagus with destructive lesions involving T2, T3, 

and T4 vertebral bodies anteriorly with extension into the spinal canal at T3.  

Patient has just recently learned of the extent of this mass.  We did discuss in

detail that this mass does involve the vertebral bodies of T2, T3, and T4 as 

well with apparent extension into the spinal canal at T3.  We did discuss that 

given the size and the significant involvement of this mass that if the patient 

were to proceed forward with surgical intervention he should be transferred to a

tertiary facility as surgical intervention would be significant and could 

require multiple surgeons and possibly multiple surgical interventions for 

resection of the mass.  Patient states at the bedside he does not wish to 

currently have any invasive treatment in this regard.  He does not currently 

want transferred as he does not currently wish to have surgical intervention in 

regards to the mediastinal mass involving the spinal cord and his thoracic 

spine.  We did discuss he could benefit with  bracing to help provide some 

stability at T2, T3, and T4.  Patient states he does not want bracing and 

declines bracing at this time.  He states currently he would like to continue 

with conservative treatment and will discuss a plan of care with his primary 

care provider as well as other providers in the hospital.  [facility] 

medicine is planning to discuss hospice care with the patient and his family 

today.  At this time, we are not planning for further imaging, testing, or 

evaluation of the patient.  We will plan to see the patient on an as-needed 

basis.  Patient does not currently feel that he would like further treatment or 

evaluation from an orthopedic spine standpoint at this time.


2.  Patient will continue to be seen examined by multiple other medical 

providers including medicine, infectious disease, pulmonology, and neurology.


3.  Consultation has been placed with interventional radiology.


Time with Patient: Greater than 30 (Including obtaining history, physical 

examination, reviewing of imaging, and dictation.)

## 2022-07-24 NOTE — P.PN
Subjective


Progress Note Date: 07/24/22


Principal diagnosis: 


Bacteremia





Patient is a 76 year old  male with a recent diagnosis of lung cancer 

has not received any chemo presenting to the hospital with weakness unable to 

stand up or get out of the bed patient did have positive blood culture with 

gram-negative bacilli.


On today's evaluation that is 07/24/2022, the patient remains to be afebrile, 

the patient still complaining of some shortness of breath especially on 

exertion,, the patient continued to have a cough and is being of some purulent 

sputum, denies any chest pain no abdominal pain and no worsening diarrhea





Objective





- Vital Signs


Vital signs: 


                                   Vital Signs











Temp  98.5 F   07/24/22 16:01


 


Pulse  80   07/24/22 16:01


 


Resp  20   07/24/22 16:01


 


BP  146/77   07/24/22 16:01


 


Pulse Ox  95   07/24/22 16:01


 


FiO2  50   07/21/22 16:30








                                 Intake & Output











 07/23/22 07/24/22 07/24/22





 18:59 06:59 18:59


 


Intake Total 698  


 


Output Total 1870 142 5714


 


Balance -502 -900 -1780


 


Intake:   


 


  Intake, IV Titration 100  





  Amount   


 


    Cefepime 2 gm In Sodium 100  





    Chloride 0.9% 100 ml @ 25   





    mls/hr IVPB Q12HR [facility] Rx   





    #:[id number]   


 


  Oral 598  


 


Output:   


 


  Urine 4757 804 8707


 


    Uretheral ([place])  900 600


 


Other:   


 


  Voiding Method Indwelling Catheter Indwelling Catheter Indwelling Catheter














- Exam


GENERAL DESCRIPTION: An elderly male up in the chair in no distress





RESPIRATORY SYSTEM: Unlabored breathing , decreased breath sounds at bases





HEART: S1 S2 regular rate and rhythm ,





ABDOMEN: Soft , no tenderness





EXTREMITIES: No edema feet








- Labs


CBC & Chem 7: 


                                 07/22/22 07:58





                                 07/22/22 07:58


Labs: 


                  Abnormal Lab Results - Last 24 Hours (Table)











  07/23/22 07/24/22 07/24/22 Range/Units





  19:50 05:56 11:36 


 


POC Glucose (mg/dL)  202 H  217 H  206 H  ()  mg/dL














  07/24/22 Range/Units





  16:29 


 


POC Glucose (mg/dL)  163 H  ()  mg/dL








                      Microbiology - Last 24 Hours (Table)











 07/22/22 12:08 Blood Culture - Preliminary





 Blood    No Growth after 48 hours


 


 07/22/22 12:13 Blood Culture - Preliminary





 Blood    No Growth after 48 hours


 


 07/20/22 18:05 Blood Culture Gram Stain - Final





 Blood Blood Culture - Final





    Pasteurella multocida


 


 07/20/22 18:02 Blood Culture Gram Stain - Final





 Blood Blood Culture - Final





    Staphylococcus epidermidis





    Pasteurella multocida





    Coagulase Negative Staph














Assessment and Plan


(1) Bacteremia


Current Visit: Yes   Status: Acute   Code(s): R78.81 - BACTEREMIA   SNOMED 

Code(s): 7533724


   





(2) Pneumonia


Current Visit: Yes   Status: Acute   Code(s): J18.9 - PNEUMONIA, UNSPECIFIED 

ORGANISM   SNOMED Code(s): 032126333


   


Plan: 


1patient with positive blood culture with staph epi likely skin contamination.


2patient with gram-negative bacteremia source could be likely pneumonia in this

patient do have underlying lung cancer and did have significant respiratory 

symptoms patient urine is negative abdominal was soft on examination.


3penicillin allergy that would limit number of antibiotics safe to use.


4patient blood culture with staph epi likely contaminated, Pasteurella is more 

likely real pathogen and is covered with the cefepime repeat Blood cultures have

been negative so far


Time with Patient: Less than 30

## 2022-07-24 NOTE — P.PN
Subjective


Progress Note Date: 07/24/22


Patient is seen at bedside and is accompanied by his wife.  Per the patient's 

wife seems that the she was called their early because of patient confusion very

late at night.  Patient continues to have a coughing episode and has 

intermittent episodes of hemoptysis.  Patient denies of any focal weakness or n

umbness or difficulty getting his words out.








Objective





- Vital Signs


Vital signs: 


                                   Vital Signs











Temp  97.6 F   07/24/22 11:50


 


Pulse  83   07/24/22 11:50


 


Resp  24   07/24/22 11:50


 


BP  145/69   07/24/22 11:50


 


Pulse Ox  95   07/24/22 11:50


 


FiO2  50   07/21/22 16:30








                                 Intake & Output











 07/23/22 07/24/22 07/24/22





 18:59 06:59 18:59


 


Intake Total 698  


 


Output Total 1200 900 800


 


Balance -502 900 -800


 


Intake:   


 


  Intake, IV Titration 100  





  Amount   


 


    Cefepime 2 gm In Sodium 100  





    Chloride 0.9% 100 ml @ 25   





    mls/hr IVPB Q12HR [facility] Rx   





    #:[id number]   


 


  Oral 598  


 


Output:   


 


  Urine 1200 900 800


 


    Uretheral ([place])  900 400


 


Other:   


 


  Voiding Method Indwelling Catheter Indwelling Catheter Indwelling Catheter














- Exam





GENERAL: The patient is sitting in a recliner chair not in acute distress.


LUNG: Quintin multiple episode of coughs.  Not labored breathing.  


Cardiac: +ve murmur.  Swelling lowers.





NEUROLOGICAL:


Higher mental function: The patient is awake, alert, oriented to self, place and

time.  Patient is following commands.  No aphasia and no neglect.


Cranial nerves: The pupils are round, equal and reactive to light and 

accommodation.  Visual fields are full to confrontation throughout.  Extraocular

movement is intact no nystagmus is noted.  Facial sensation is normal to touch 

throughout.  The facial strength is normal throughout.  Hearing is moderately 

decreased bilaterally to hand rub.  Tongue is midline and moved side-to-side 

without any difficulty.  No dysarthria is noted.  Shoulder shrug is normal bi

laterally.


Motor: The strength is 5 over 5 throughout.  Normal tone and bulk.  


Cerebellum: Normal finger to nose bilaterally.


Sensation: Sensation is normal to touch throughout.





Some of the other workup during his hospital visit consisted of:


CT of the head is reported as negative CT scan of the brain.  Mild atrophy.  I 

personally reviewed the CT head and I agree with the report.


The cervical and thoracic spine is reported as large posterior mediastinal mass 

with involvement of the upper thoracic vertebral bodies and pathological 

compression fracture of T3.  There is evidence for tumor extension into the 

spinal canal 50% narrowing.  MRI scan would be helpful for degenerative ev

aluation of the spinal canal.  Bone destruction has progressed compared to the 

recent CT scan on the 07/08/2022


Initial white blood cell is 23,000 and currently is 17,000 at predominantly 

neutrophilic


Initial creatinine is 1.72 and BUN is 35 and most current


Calcium is 10.0


AST 16 ALT is the 11.


Corona virus PCR was not detected











- Labs


CBC & Chem 7: 


                                 07/22/22 07:58





                                 07/22/22 07:58


Labs: 


                  Abnormal Lab Results - Last 24 Hours (Table)











  07/23/22 07/23/22 07/24/22 Range/Units





  16:49 19:50 05:56 


 


POC Glucose (mg/dL)  253 H  202 H  217 H  ()  mg/dL














  07/24/22 Range/Units





  11:36 


 


POC Glucose (mg/dL)  206 H  ()  mg/dL








                      Microbiology - Last 24 Hours (Table)











 07/20/22 18:05 Blood Culture Gram Stain - Final





 Blood Blood Culture - Final





    Pasteurella multocida


 


 07/20/22 18:02 Blood Culture Gram Stain - Final





 Blood Blood Culture - Final





    Staphylococcus epidermidis





    Pasteurella multocida





    Coagulase Negative Staph


 


 07/22/22 12:08 Blood Culture - Preliminary





 Blood    No Growth after 24 hours


 


 07/22/22 12:13 Blood Culture - Preliminary





 Blood    No Growth after 24 hours


 


 07/20/22 20:31 Gram Stain - Final





 Sputum Sputum Culture - Final














Assessment and Plan


Assessment: 





Delerium: Possibly due to multifactorial: Hospital induced, medications 

(steroids) and has sun downing--currently oriented X4 and no focal deficits.


Mediastinal mass in the superior region concerning for underlying malignancy.  

Has tracheal wall invasion and invasion of T2 vertebral body.  Had bronchoscopy 

and pending pathology.


Back pain due to above


Bacteremia secondary due to gram-negative bacilli


Hemoptysis due to the lung mass.


acute on chronic stage III disease--trending down


History of sarcoidosis


History of aortic valve stenosis with previous TAVR and pacemaker


COPD





Plan: 





His episode of confusion are unlikely seizures.  But spoke with patient and his 

wife and they do not want to pursue with routine EEG.  


Cannot obtain MRI of the brain since the patient has a pacemaker


Infection disease is on board


Had bronchoscopy and pending pathology.  Pulmonary team is on board


Orthopedic team is on board for T3 distraction


We'll defer the rest of the medical measure the primary team


The wife wishes to take the patient home.





The plan is discussed with the patient, his wife was at bedside and his nurse.


Neurology will sign off.  Please reconsult if any additional concerns.





Robert Patel M.D.


Neuro-hospital





Time with Patient: Less than 30

## 2022-07-24 NOTE — P.PN
Subjective


Progress Note Date: 07/24/22





Patient is a 76-year-old male with a long history of out, hypertension, 

hyperlipidemia, hearing Glusartel/deafness, obstructives, aortic stenosis and 

previous history of TAVR in 2020, history of permanent pacemaker placement, 

history of sarcoidosis and CKD stage III and other mild medical problems present

s ER with complaints of generalized weakness and unable to stand up get out of 

bed.  Patient is also having chronic pain in his chest and back and also using 

fentanyl patch and OxyContin at home.  He has been having hemoptysis for the 

past 4 months and also scheduled for bronchoscopy due to recently diagnosed lung

mass.  Patient had CT chest done on 6/10/2022.  Patient is also having loss of 

weight.  No complaints of chest pain or worsening shortness of breath.  On 

admission blood pressures 97/55 and pulse 73 respiration 16 and pulse ox 95% on 

4 L oxygen via nasal cannula.


Chest x-ray showed no evidence for acute pulmonary disease.


EKG showed electronic ventricular paced rhythm.


Chest x-ray showed patchy bilateral infiltrate.  Marker soft tissue prominence 

right paratracheal region likely corresponds to the previous CT described mass.


Laboratory pressure WBC 23.0 hemoglobin 12.0 and platelets 268


INR 1.1


Sodium 131 potassium 4.1 chloride 92 bicarb 33 BUN 35 and creatinine 1.72


Troponin 0.119 and proBNP 6640 and albumin 2.7


Urinalysis negative for infection


Coronavirus PCR not detected.





7/22/2022


Patient is currently in the select care unit.  Awake alert and oriented.  Still 

having shortness of breath.  Pain is fairly controlled.  Oxygen at 3 L via nasal

cannula.  Patient is having generalized weakness.  Blood cultures showed gram-

negative bacilli.  Patient remains on antibiotics.


Patient underwent bronchoscopy with ultrasound and FNA of the superior 

mediastinal mass.


Laboratory data showed WBC 17.0 hemoglobin 11.8 and platelets 247


Sodium 133 potassium 4.5 chloride 94 bicarb is 24 BUN 49 and creatinine 1.47 and

procalcitonin level is 4.1.





7/23/2022


Patient is sitting in the chair.  Awake alert and oriented x3.  Currently 

requiring 3 L oxygen via nasal cannula.  CT of the cervical and thoracic spine 

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

oracovertebral bodies and pathologic compression fracture of T3.  There is 

evidence of tumor extension into the spinal canal 50% narrowing.  MRI scan would

be helpful for definitive evaluation of the spinal canal.  Bone destruction has 

progressive compared to recent CT scan on 7/8/2022.


Patient was seen by orthopedic surgery and recommends further evaluation and 

intensive care facility.


CT head showed normal CT scan of the brain.


Patient is being continued on IV steroids, duo nebs and broad-spectrum 

antibiotics.  Patient is on IV cefepime.


Patient does not want any surgical intervention at this time and wants to 

continue with pain management.





7/24/2022


Patient is currently sitting in the chair.  Awake alert and oriented x3.  

Breathing status remains the same.  Requiring oxygen at 3 L via nasal cannula.  

No complaints of chest pain.  No nausea vomiting or abdominal pain.  Tolerating 

oral diet and pain is fairly controlled.  Laboratory tests reviewed.  Final 

blood culture showed Pasteurella multocida, staph epidermis, coagulase-negative 

staph.  Patient remains on antibiotics in the form of cefepime.  Also on 

antibiotics and methylprednisolone and duo nebs.


Endobronchial biopsy report is pending.,


Orthopedic surgery and pulmonary is on board.  Oncology was consulted.





Current medications reviewed.








Objective





- Vital Signs


Vital signs: 


                                   Vital Signs











Temp  97.9 F   07/24/22 19:36


 


Pulse  74   07/24/22 19:36


 


Resp  18   07/24/22 19:36


 


BP  157/74   07/24/22 19:36


 


Pulse Ox  97   07/24/22 19:36


 


FiO2  28   07/24/22 19:25








                                 Intake & Output











 07/24/22 07/24/22 07/25/22





 06:59 18:59 06:59


 


Output Total 900 1780 


 


Balance -900 -1780 


 


Output:   


 


  Urine 900 1780 


 


    Uretheral ([place]) 900 600 


 


Other:   


 


  Voiding Method Indwelling Catheter Indwelling Catheter 














- Exam





PHYSICAL EXAMINATION: 


Patient is lying in the bed comfortably, no acute distress, awake alert and 

oriented.. 


HEENT: Normocephalic. Neck is supple. Pupils reactive. Nostrils clear. Oral 

cavity is moist. 


Neck reveals no JVD, carotid bruits, or thyromegaly. 


CHEST EXAMINATION: Trachea is central. Symmetrical expansion. Bilateral coarse 

breath sounds and crackles.. 


CARDIAC: Normal S1, S2 with no gallops. No murmurs 


ABDOMEN: Soft. Bowel sounds present.  Nontender.  No organomegaly. No abdominal 

bruits. 


Extremities: trace edema.  No clubbing or cyanosis


Neurologically awake, alert, oriented x3 with well-coordinated movements.  No 

focal deficits noted


Skin: No rash or skin lesions. 


Psychiatric: Coperative.  Nonsuicidal, anxious.


Musculoskeletal: No joint swelling or deformity.  Normal range of motion.








- Labs


CBC & Chem 7: 


                                 07/22/22 07:58





                                 07/22/22 07:58


Labs: 


                  Abnormal Lab Results - Last 24 Hours (Table)











  07/24/22 07/24/22 07/24/22 Range/Units





  05:56 11:36 16:29 


 


POC Glucose (mg/dL)  217 H  206 H  163 H  ()  mg/dL














  07/24/22 Range/Units





  19:59 


 


POC Glucose (mg/dL)  188 H  ()  mg/dL








                      Microbiology - Last 24 Hours (Table)











 07/22/22 12:08 Blood Culture - Preliminary





 Blood    No Growth after 48 hours


 


 07/22/22 12:13 Blood Culture - Preliminary





 Blood    No Growth after 48 hours


 


 07/20/22 18:05 Blood Culture Gram Stain - Final





 Blood Blood Culture - Final





    Pasteurella multocida


 


 07/20/22 18:02 Blood Culture Gram Stain - Final





 Blood Blood Culture - Final





    Staphylococcus epidermidis





    Pasteurella multocida





    Coagulase Negative Staph














Assessment and Plan


Assessment: 








Right paratracheal lung mass suspicious for malignancy. s/p EBUS and FNA biposy


Pathologic fracture of the T3 spine and evidence of spine compression and 

disease progression compared to previous CT on 7/8/2020.


Possible postobstructive pneumonia


Recent history of hemoptysis


Hypovolemic hyponatremia


Acute kidney injury likely prerenal with CKD stage III


Troponin leak 


leukocytosis


COPD not in exacerbation


Chronic back pain


History of aortic stenosis status post TAVR in 2020


History of sarcoidosis


DVT prophylaxis with heparin subcu





Plan:


Patient underwent bronchoscopy today.  Follow-up biopsy report.


Patient will be oxygen supplementation and antibiotics in the form of cefepime. 

Pulmonary is planning for bronchoscopy today.  Continue with DuoNebs.


Current pain management and home medications including blood pressure 

medications.  Currently on fentanyl patch and Percocet 10.


Patient does have extensive disease with fracture of T3 spine and evidence of 

spine compression and disease progression.  Prognosis is poor at this time.


Patient wishes to continue with pain management.  Does not want any intervention

at this time.  Discussed with the patient and his wife at bedside in detail.


Time with Patient: Greater than 30

## 2022-07-24 NOTE — P.PN
Subjective


Progress Note Date: 07/23/22





Patient is a 76-year-old male with a long history of out, hypertension, 

hyperlipidemia, hearing Glusartel/deafness, obstructives, aortic stenosis and 

previous history of TAVR in 2020, history of permanent pacemaker placement, 

history of sarcoidosis and CKD stage III and other mild medical problems present

s ER with complaints of generalized weakness and unable to stand up get out of 

bed.  Patient is also having chronic pain in his chest and back and also using 

fentanyl patch and OxyContin at home.  He has been having hemoptysis for the 

past 4 months and also scheduled for bronchoscopy due to recently diagnosed lung

mass.  Patient had CT chest done on 6/10/2022.  Patient is also having loss of 

weight.  No complaints of chest pain or worsening shortness of breath.  On 

admission blood pressures 97/55 and pulse 73 respiration 16 and pulse ox 95% on 

4 L oxygen via nasal cannula.


Chest x-ray showed no evidence for acute pulmonary disease.


EKG showed electronic ventricular paced rhythm.


Chest x-ray showed patchy bilateral infiltrate.  Marker soft tissue prominence 

right paratracheal region likely corresponds to the previous CT described mass.


Laboratory pressure WBC 23.0 hemoglobin 12.0 and platelets 268


INR 1.1


Sodium 131 potassium 4.1 chloride 92 bicarb 33 BUN 35 and creatinine 1.72


Troponin 0.119 and proBNP 6640 and albumin 2.7


Urinalysis negative for infection


Coronavirus PCR not detected.





7/22/2022


Patient is currently in the select care unit.  Awake alert and oriented.  Still 

having shortness of breath.  Pain is fairly controlled.  Oxygen at 3 L via nasal

cannula.  Patient is having generalized weakness.  Blood cultures showed gram-

negative bacilli.  Patient remains on antibiotics.


Patient underwent bronchoscopy with ultrasound and FNA of the superior 

mediastinal mass.


Laboratory data showed WBC 17.0 hemoglobin 11.8 and platelets 247


Sodium 133 potassium 4.5 chloride 94 bicarb is 24 BUN 49 and creatinine 1.47 and

procalcitonin level is 4.1.





7/23/2022


Patient is sitting in the chair.  Awake alert and oriented x3.  Currently 

requiring 3 L oxygen via nasal cannula.  CT of the cervical and thoracic spine 

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

oracovertebral bodies and pathologic compression fracture of T3.  There is 

evidence of tumor extension into the spinal canal 50% narrowing.  MRI scan would

be helpful for definitive evaluation of the spinal canal.  Bone destruction has 

progressive compared to recent CT scan on 7/8/2022.


Patient was seen by orthopedic surgery and recommends further evaluation and 

intensive care facility.


CT head showed normal CT scan of the brain.


Patient is being continued on IV steroids, duo nebs and broad-spectrum 

antibiotics.  Patient is on IV cefepime.


Patient does not want any surgical intervention at this time and wants to 

continue with pain management.








Current medications reviewed.








Objective





- Vital Signs


Vital signs: 


                                   Vital Signs











Temp  98.1 F   07/23/22 20:00


 


Pulse  70   07/23/22 20:26


 


Resp  14   07/23/22 20:26


 


BP  132/60   07/23/22 20:00


 


Pulse Ox  99   07/23/22 20:00


 


FiO2  50   07/21/22 16:30








                                 Intake & Output











 07/23/22 07/23/22 07/24/22





 06:59 18:59 06:59


 


Intake Total  698 


 


Output Total 550 1200 


 


Balance -550 -502 


 


Intake:   


 


  Intake, IV Titration  100 





  Amount   


 


    Cefepime 2 gm In Sodium  100 





    Chloride 0.9% 100 ml @ 25   





    mls/hr IVPB Q12HR [facility]   





    #:[id number]   


 


  Oral  598 


 


Output:   


 


  Urine 550 1200 


 


Other:   


 


  Voiding Method Indwelling Catheter Indwelling Catheter 














- Exam





PHYSICAL EXAMINATION: 


Patient is lying in the bed comfortably, no acute distress, awake alert and 

oriented.. 


HEENT: Normocephalic. Neck is supple. Pupils reactive. Nostrils clear. Oral 

cavity is moist. 


Neck reveals no JVD, carotid bruits, or thyromegaly. 


CHEST EXAMINATION: Trachea is central. Symmetrical expansion. Bilateral coarse 

breath sounds and crackles.. 


CARDIAC: Normal S1, S2 with no gallops. No murmurs 


ABDOMEN: Soft. Bowel sounds present.  Nontender.  No organomegaly. No abdominal 

bruits. 


Extremities: trace edema.  No clubbing or cyanosis


Neurologically awake, alert, oriented x3 with well-coordinated movements.  No 

focal deficits noted


Skin: No rash or skin lesions. 


Psychiatric: Coperative.  Nonsuicidal, anxious.


Musculoskeletal: No joint swelling or deformity.  Normal range of motion.








- Labs


CBC & Chem 7: 


                                 07/22/22 07:58





                                 07/22/22 07:58


Labs: 


                  Abnormal Lab Results - Last 24 Hours (Table)











  07/23/22 07/23/22 07/23/22 Range/Units





  06:13 11:53 16:49 


 


POC Glucose (mg/dL)  256 H  218 H  253 H  ()  mg/dL














  07/23/22 Range/Units





  19:50 


 


POC Glucose (mg/dL)  202 H  ()  mg/dL








                      Microbiology - Last 24 Hours (Table)











 07/22/22 12:08 Blood Culture - Preliminary





 Blood    No Growth after 24 hours


 


 07/22/22 12:13 Blood Culture - Preliminary





 Blood    No Growth after 24 hours


 


 07/20/22 20:31 Gram Stain - Final





 Sputum Sputum Culture - Final


 


 07/20/22 18:02 Blood Culture Gram Stain - Preliminary





 Blood Blood Culture - Preliminary





    Staphylococcus epidermidis





    Gram Neg Bacilli





    Coagulase Negative Staph














Assessment and Plan


Assessment: 








Right paratracheal lung mass suspicious for malignancy. s/p EBUS and FNA biposy


Pathologic fracture of the T3 spine and evidence of spine compression and 

disease progression compared to previous CT on 7/8/2020.


Possible postobstructive pneumonia


Recent history of hemoptysis


Hypovolemic hyponatremia


Acute kidney injury likely prerenal with CKD stage III


Troponin leak 


leukocytosis


COPD not in exacerbation


Chronic back pain


History of aortic stenosis status post TAVR in 2020


History of sarcoidosis


DVT prophylaxis with heparin subcu





Plan:


Patient underwent bronchoscopy today.  Follow-up biopsy report.


Patient will be oxygen supplementation and antibiotics in the form of cefepime. 

Pulmonary is planning for bronchoscopy today.  Continue with [place].


Current pain management and home medications including blood pressure 

medications.  Currently on fentanyl patch and Percocet 10.


Patient does have extensive disease with fracture of T3 spine and evidence of 

spine compression and disease progression.  Prognosis is poor at this time.


Time with Patient: Greater than 30

## 2022-07-24 NOTE — P.PN
Subjective


Progress Note Date: 07/22/22





Patient is a 76-year-old male with a long history of out, hypertension, 

hyperlipidemia, hearing Glusartel/deafness, obstructives, aortic stenosis and 

previous history of TAVR in 2020, history of permanent pacemaker placement, 

history of sarcoidosis and CKD stage III and other mild medical problems present

s ER with complaints of generalized weakness and unable to stand up get out of 

bed.  Patient is also having chronic pain in his chest and back and also using 

fentanyl patch and OxyContin at home.  He has been having hemoptysis for the 

past 4 months and also scheduled for bronchoscopy due to recently diagnosed lung

mass.  Patient had CT chest done on 6/10/2022.  Patient is also having loss of 

weight.  No complaints of chest pain or worsening shortness of breath.  On 

admission blood pressures 97/55 and pulse 73 respiration 16 and pulse ox 95% on 

4 L oxygen via nasal cannula.


Chest x-ray showed no evidence for acute pulmonary disease.


EKG showed electronic ventricular paced rhythm.


Chest x-ray showed patchy bilateral infiltrate.  Marker soft tissue prominence 

right paratracheal region likely corresponds to the previous CT described mass.


Laboratory pressure WBC 23.0 hemoglobin 12.0 and platelets 268


INR 1.1


Sodium 131 potassium 4.1 chloride 92 bicarb 33 BUN 35 and creatinine 1.72


Troponin 0.119 and proBNP 6640 and albumin 2.7


Urinalysis negative for infection


Coronavirus PCR not detected.





7/22/2022


Patient is currently in the select care unit.  Awake alert and oriented.  Still 

having shortness of breath.  Pain is fairly controlled.  Oxygen at 3 L via nasal

cannula.  Patient is having generalized weakness.  Blood cultures showed gram-

negative bacilli.  Patient remains on antibiotics.


Patient underwent bronchoscopy with ultrasound and FNA of the superior 

mediastinal mass.


Laboratory data showed WBC 17.0 hemoglobin 11.8 and platelets 247


Sodium 133 potassium 4.5 chloride 94 bicarb is 24 BUN 49 and creatinine 1.47 and

procalcitonin level is 4.1.





Current medications reviewed.








Objective





- Vital Signs


Vital signs: 


                                   Vital Signs











Temp  98.1 F   07/23/22 20:00


 


Pulse  70   07/23/22 20:26


 


Resp  14   07/23/22 20:26


 


BP  132/60   07/23/22 20:00


 


Pulse Ox  99   07/23/22 20:00


 


FiO2  50   07/21/22 16:30








                                 Intake & Output











 07/23/22 07/23/22 07/24/22





 06:59 18:59 06:59


 


Intake Total  698 


 


Output Total 550 1200 


 


Balance -550 -502 


 


Intake:   


 


  Intake, IV Titration  100 





  Amount   


 


    Cefepime 2 gm In Sodium  100 





    Chloride 0.9% 100 ml @ 25   





    mls/hr IVPB Q12HR [facility]   





    #:[id number]   


 


  Oral  598 


 


Output:   


 


  Urine 550 1200 


 


Other:   


 


  Voiding Method Indwelling Catheter Indwelling Catheter 














- Exam





PHYSICAL EXAMINATION: 


Patient is lying in the bed comfortably, no acute distress, awake alert and 

oriented.. 


HEENT: Normocephalic. Neck is supple. Pupils reactive. Nostrils clear. Oral 

cavity is moist. 


Neck reveals no JVD, carotid bruits, or thyromegaly. 


CHEST EXAMINATION: Trachea is central. Symmetrical expansion. Bilateral coarse 

breath sounds and crackles.. 


CARDIAC: Normal S1, S2 with no gallops. No murmurs 


ABDOMEN: Soft. Bowel sounds present.  Nontender.  No organomegaly. No abdominal 

bruits. 


Extremities: trace edema.  No clubbing or cyanosis


Neurologically awake, alert, oriented x3 with well-coordinated movements.  No 

focal deficits noted


Skin: No rash or skin lesions. 


Psychiatric: Coperative.  Nonsuicidal, anxious.


Musculoskeletal: No joint swelling or deformity.  Normal range of motion.








- Labs


CBC & Chem 7: 


                                 07/22/22 07:58





                                 07/22/22 07:58


Labs: 


                  Abnormal Lab Results - Last 24 Hours (Table)











  07/23/22 07/23/22 07/23/22 Range/Units





  06:13 11:53 16:49 


 


POC Glucose (mg/dL)  256 H  218 H  253 H  ()  mg/dL














  07/23/22 Range/Units





  19:50 


 


POC Glucose (mg/dL)  202 H  ()  mg/dL








                      Microbiology - Last 24 Hours (Table)











 07/22/22 12:08 Blood Culture - Preliminary





 Blood    No Growth after 24 hours


 


 07/22/22 12:13 Blood Culture - Preliminary





 Blood    No Growth after 24 hours


 


 07/20/22 20:31 Gram Stain - Final





 Sputum Sputum Culture - Final


 


 07/20/22 18:02 Blood Culture Gram Stain - Preliminary





 Blood Blood Culture - Preliminary





    Staphylococcus epidermidis





    Gram Neg Bacilli





    Coagulase Negative Staph














Assessment and Plan


Assessment: 








Right paratracheal lung mass suspicious for malignancy. s/p EBUS and FNA biposy


Possible postobstructive pneumonia


Recent history of hemoptysis


Hypovolemic hyponatremia


Acute kidney injury likely prerenal with CKD stage III


Troponin leak 


leukocytosis


COPD not in exacerbation


Chronic back pain


History of aortic stenosis status post TAVR in 2020


History of sarcoidosis


DVT prophylaxis with heparin subcu





Plan:


Patient underwent bronchoscopy today.  Follow-up biopsy report.


Patient will be oxygen supplementation and antibiotics in the form of 

ceftriaxone azithromycin.  Pulmonary is planning for bronchoscopy today.  

Continue with DuoNebs.


Current pain management and home medications including blood pressure 

medications.  Currently on fentanyl patch and Percocet 10.  Follow closely.  

Prognosis guarded at this time.





Time with Patient: Greater than 30

## 2022-07-25 VITALS
TEMPERATURE: 98.7 F | SYSTOLIC BLOOD PRESSURE: 171 MMHG | HEART RATE: 78 BPM | DIASTOLIC BLOOD PRESSURE: 89 MMHG | RESPIRATION RATE: 19 BRPM

## 2022-07-25 LAB
ANION GAP SERPL CALC-SCNC: 2 MMOL/L
BASOPHILS # BLD AUTO: 0 K/UL (ref 0–0.2)
BASOPHILS NFR BLD AUTO: 0 %
BUN SERPL-SCNC: 41 MG/DL (ref 9–20)
CALCIUM SPEC-MCNC: 11.5 MG/DL (ref 8.4–10.2)
CHLORIDE SERPL-SCNC: 96 MMOL/L (ref 98–107)
CO2 SERPL-SCNC: 36 MMOL/L (ref 22–30)
EOSINOPHIL # BLD AUTO: 0 K/UL (ref 0–0.7)
EOSINOPHIL NFR BLD AUTO: 0 %
ERYTHROCYTE [DISTWIDTH] IN BLOOD BY AUTOMATED COUNT: 4.35 M/UL (ref 4.3–5.9)
ERYTHROCYTE [DISTWIDTH] IN BLOOD: 14.4 % (ref 11.5–15.5)
GLUCOSE BLD-MCNC: 136 MG/DL (ref 70–110)
GLUCOSE BLD-MCNC: 148 MG/DL (ref 70–110)
GLUCOSE BLD-MCNC: 173 MG/DL (ref 70–110)
GLUCOSE SERPL-MCNC: 162 MG/DL (ref 74–99)
HCT VFR BLD AUTO: 40.2 % (ref 39–53)
HGB BLD-MCNC: 12.3 GM/DL (ref 13–17.5)
LYMPHOCYTES # SPEC AUTO: 0.4 K/UL (ref 1–4.8)
LYMPHOCYTES NFR SPEC AUTO: 2 %
MCH RBC QN AUTO: 28.2 PG (ref 25–35)
MCHC RBC AUTO-ENTMCNC: 30.5 G/DL (ref 31–37)
MCV RBC AUTO: 92.3 FL (ref 80–100)
MONOCYTES # BLD AUTO: 0.4 K/UL (ref 0–1)
MONOCYTES NFR BLD AUTO: 2 %
NEUTROPHILS # BLD AUTO: 17.2 K/UL (ref 1.3–7.7)
NEUTROPHILS NFR BLD AUTO: 95 %
PLATELET # BLD AUTO: 217 K/UL (ref 150–450)
POTASSIUM SERPL-SCNC: 4.5 MMOL/L (ref 3.5–5.1)
SODIUM SERPL-SCNC: 134 MMOL/L (ref 137–145)
WBC # BLD AUTO: 18 K/UL (ref 3.8–10.6)

## 2022-07-25 RX ADMIN — IPRATROPIUM BROMIDE AND ALBUTEROL SULFATE SCH: .5; 3 SOLUTION RESPIRATORY (INHALATION) at 11:08

## 2022-07-25 RX ADMIN — METOPROLOL SUCCINATE SCH MG: 50 TABLET, EXTENDED RELEASE ORAL at 09:42

## 2022-07-25 RX ADMIN — IPRATROPIUM BROMIDE AND ALBUTEROL SULFATE SCH: .5; 3 SOLUTION RESPIRATORY (INHALATION) at 15:42

## 2022-07-25 RX ADMIN — PANTOPRAZOLE SODIUM SCH MG: 40 TABLET, DELAYED RELEASE ORAL at 06:45

## 2022-07-25 RX ADMIN — BUDESONIDE AND FORMOTEROL FUMARATE DIHYDRATE SCH: 160; 4.5 AEROSOL RESPIRATORY (INHALATION) at 08:37

## 2022-07-25 RX ADMIN — INSULIN ASPART SCH UNIT: 100 INJECTION, SOLUTION INTRAVENOUS; SUBCUTANEOUS at 06:44

## 2022-07-25 RX ADMIN — LACTULOSE SCH GM: 20 SOLUTION ORAL at 09:42

## 2022-07-25 RX ADMIN — INSULIN ASPART SCH: 100 INJECTION, SOLUTION INTRAVENOUS; SUBCUTANEOUS at 17:13

## 2022-07-25 RX ADMIN — METHYLPREDNISOLONE SODIUM SUCCINATE SCH MG: 125 INJECTION, POWDER, FOR SOLUTION INTRAMUSCULAR; INTRAVENOUS at 06:44

## 2022-07-25 RX ADMIN — IPRATROPIUM BROMIDE AND ALBUTEROL SULFATE SCH: .5; 3 SOLUTION RESPIRATORY (INHALATION) at 08:37

## 2022-07-25 RX ADMIN — INSULIN ASPART SCH UNIT: 100 INJECTION, SOLUTION INTRAVENOUS; SUBCUTANEOUS at 12:49

## 2022-07-25 RX ADMIN — CEFEPIME HYDROCHLORIDE SCH MLS/HR: 2 INJECTION, POWDER, FOR SOLUTION INTRAVENOUS at 09:42

## 2022-07-25 RX ADMIN — METHYLPREDNISOLONE SODIUM SUCCINATE SCH MG: 125 INJECTION, POWDER, FOR SOLUTION INTRAMUSCULAR; INTRAVENOUS at 12:49

## 2022-07-25 RX ADMIN — OXYCODONE HYDROCHLORIDE AND ACETAMINOPHEN PRN EACH: 10; 325 TABLET ORAL at 09:45

## 2022-07-25 RX ADMIN — METHYLPREDNISOLONE SODIUM SUCCINATE SCH: 125 INJECTION, POWDER, FOR SOLUTION INTRAMUSCULAR; INTRAVENOUS at 17:13

## 2022-07-25 NOTE — P.PN
Progress Note - Text


Progress Note Date: 07/25/22





The patient is seen today at bedside.


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

 The patient is planning hospice care at home.  He is not planning significant 

intervention or any surgical intervention for his destructive lesion at T3.  We 

discussed this and he is reasonable and understands the ramifications of his 

pathology and treatment options.  He is planning hospice care at home.

## 2022-07-25 NOTE — P.PN
Subjective


Progress Note Date: 07/25/22


Principal diagnosis: 


 Hemoptysis, mediastinal mass





 On 07/25/2022 patient seen in follow-up on selective care unit.  He is resting 

in bed, does not appear to be in any acute distress, denies any worsening cough 

or congestion, seems to breathing comfortably, denies any worsening dyspnea, he 

is currently on 2 L of oxygen with pulse ox of 99%, he is status post EBUs 

bronchoscopy, with a fine-needle biopsy of the mediastinal mass, and pathology 

reports are still pending.  Patient had a large posterior superior mediastinal 

mass with direct anterolateral invasion of the T2 vertebral body that showed 

high activity on the PET scan and was highly suspicious for tracheal wall invas

ion and metastatic malignancy.  Patient also had pathologic fracture of T3 

spine, he was seen In consultation by orthopedic surgery however he declined any

surgical intervention and he wants no escalation of his medical care for 

possibility of underlying malignancy.  He has decided to go home with hospice 

and at present he seems to be quite anxious to get home today








Objective





- Vital Signs


Vital signs: 


                                   Vital Signs











Temp  97.9 F   07/25/22 08:00


 


Pulse  74   07/25/22 08:00


 


Resp  18   07/25/22 08:00


 


BP  158/71   07/25/22 08:00


 


Pulse Ox  99   07/25/22 08:00


 


FiO2  28   07/24/22 19:25








                                 Intake & Output











 07/24/22 07/25/22 07/25/22





 18:59 06:59 18:59


 


Output Total 1780 300 


 


Balance -1780 -300 


 


Output:   


 


  Urine 1780 300 


 


    Uretheral ([place]) 600  


 


Other:   


 


  Voiding Method Indwelling Catheter  Indwelling Catheter


 


  # Voids  1 














- Exam


 GENERAL EXAM: Alert, very pleasant, 68-year-old white male on 2 L of oxygen and

pulse ox of 99% comfortable in no apparent distress.


HEAD: Normocephalic/atraumatic.


EYES: Normal reaction of pupils, equal size.  Conjunctiva pink, sclera white.


NOSE: Clear with pink turbinates.


THROAT: No erythema or exudates.


NECK: No masses, no JVD, no thyroid enlargement, no adenopathy.


CHEST: No chest wall deformity.  Symmetrical expansion. 


LUNGS: Equal air entry with no crackles, wheeze, rhonchi or dullness.


CVS: Regular rate and rhythm, normal S1 and S2, no gallops, no murmurs, no rubs


ABDOMEN: Soft, nontender.  No hepatosplenomegaly, normal bowel sounds, no 

guarding or rigidity.


EXTREMITIES: No clubbing, no edema, no cyanosis, 2+ pulses and upper and lower 

extremities.


MUSCULOSKELETAL: Muscle strength and tone normal.


SPINE: No scoliosis or deformity


SKIN: No rashes


CENTRAL NERVOUS SYSTEM: Alert and oriented -3.  No focal deficits, tone is 

normal in all 4 extremities.


PSYCHIATRIC: Alert and oriented -3.  Appropriate affect.  Intact judgment and 

insight.











- Labs


CBC & Chem 7: 


                                 07/25/22 07:18





                                 07/25/22 07:18


Labs: 


                  Abnormal Lab Results - Last 24 Hours (Table)











  07/24/22 07/24/22 07/25/22 Range/Units





  16:29 19:59 06:07 


 


WBC     (3.8-10.6)  k/uL


 


Hgb     (13.0-17.5)  gm/dL


 


MCHC     (31.0-37.0)  g/dL


 


Neutrophils #     (1.3-7.7)  k/uL


 


Lymphocytes #     (1.0-4.8)  k/uL


 


Sodium     (137-145)  mmol/L


 


Chloride     ()  mmol/L


 


Carbon Dioxide     (22-30)  mmol/L


 


BUN     (9-20)  mg/dL


 


Glucose     (74-99)  mg/dL


 


POC Glucose (mg/dL)  163 H  188 H  173 H  ()  mg/dL


 


Calcium     (8.4-10.2)  mg/dL














  07/25/22 07/25/22 07/25/22 Range/Units





  07:18 07:18 11:21 


 


WBC  18.0 H    (3.8-10.6)  k/uL


 


Hgb  12.3 L    (13.0-17.5)  gm/dL


 


MCHC  30.5 L    (31.0-37.0)  g/dL


 


Neutrophils #  17.2 H    (1.3-7.7)  k/uL


 


Lymphocytes #  0.4 L    (1.0-4.8)  k/uL


 


Sodium   134 L   (137-145)  mmol/L


 


Chloride   96 L   ()  mmol/L


 


Carbon Dioxide   36 H   (22-30)  mmol/L


 


BUN   41 H   (9-20)  mg/dL


 


Glucose   162 H   (74-99)  mg/dL


 


POC Glucose (mg/dL)    136 H  ()  mg/dL


 


Calcium   11.5 H   (8.4-10.2)  mg/dL








                      Microbiology - Last 24 Hours (Table)











 07/22/22 12:08 Blood Culture - Preliminary





 Blood    No Growth after 48 hours


 


 07/22/22 12:13 Blood Culture - Preliminary





 Blood    No Growth after 48 hours


 


 07/20/22 18:05 Blood Culture Gram Stain - Final





 Blood Blood Culture - Final





    Pasteurella multocida


 


 07/20/22 18:02 Blood Culture Gram Stain - Final





 Blood Blood Culture - Final





    Staphylococcus epidermidis





    Pasteurella multocida





    Coagulase Negative Staph














Assessment and Plan


Plan: 


 Hemoptysis, no evidence of endobronchial lesion or bleeding, bronchoscopy 

revealed no evidence of any tracheal or endobronchial tumors.  The patient 

however has a large posterior superior mediastinal mass





Mediastinal mass.  The patient is a 6 x 5 x 5 cm posterior superior mediastinal 

mass, with direct anterolateral invasion of the T2 vertebral body, highly active

on PET scan and highly suspicious for tracheal wall invasion posteriorly.  This 

is most likely an underlying malignancy.  Status post EBUS bronchoscopy with FNA

of the mediastinal mass.  Pathology pending





Pathologic fracture of the T3 spine and evidence of spine compression in the 

order of 50%.  Please refer to the CAT scan of the cervical spine.  The patient 

has motor weakness in the upper extremities.  The patient has no motor weakness 

in lower extremities.  The patient is experiencing back pain.  The patient is 

currently on IV Solu Medrol.  Findings are stable.  Awaiting spine surgery and 

radiation oncology.





Bacteremia secondary to gram-negative bacilli, awaiting further cultures.  The 

pro-calcitonin level is also elevated and the patient currently remains on IV 

cefepime.  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 

case





Back pain secondary to above with some upper extremity weakness





COPD





History aortic valve stenosis with a previous TAVR and a previous pacemaker 

insertion.  Echocardiogram from May 2022 showed preserved LV, bioprosthetic 

aortic valve which is in place and no significant leaks





Chronic stage III kidney disease, creatinine stable





History of sarcoidosis which is currently inactive in stable





Troponin leak, nonspecific





Acute leukocytosis, improving and the white cell count is down to 18





Plan:





Patient's FNA biopsy results of the mediastinal mass are still pending


Patient is breathing comfortably and does not appear to be in any acute distress


He has declined any surgical intervention for his T3 pathological fracture


He is very adamant that he wants to go home today with hospice


And he does not want to wait for his path results


Consult to hospice has been initiated


We'll defer to primary care service post for discharge arrangements





I have personally seen and examined the patient, performed the documentation and

the assessment and  plan as written.  Number of minutes spent on the visit: [10]

 














Time with Patient: Less than 30

## 2022-07-26 ENCOUNTER — HOSPITAL ENCOUNTER (EMERGENCY)
Dept: HOSPITAL 47 - EC | Age: 77
Discharge: HOME | End: 2022-07-26
Payer: MEDICARE

## 2022-07-26 VITALS — TEMPERATURE: 98.4 F | SYSTOLIC BLOOD PRESSURE: 150 MMHG | HEART RATE: 80 BPM | DIASTOLIC BLOOD PRESSURE: 74 MMHG

## 2022-07-26 VITALS — RESPIRATION RATE: 16 BRPM

## 2022-07-26 DIAGNOSIS — Z79.899: ICD-10-CM

## 2022-07-26 DIAGNOSIS — F05: Primary | ICD-10-CM

## 2022-07-26 DIAGNOSIS — E86.0: ICD-10-CM

## 2022-07-26 DIAGNOSIS — Z91.048: ICD-10-CM

## 2022-07-26 DIAGNOSIS — I10: ICD-10-CM

## 2022-07-26 DIAGNOSIS — Z87.891: ICD-10-CM

## 2022-07-26 DIAGNOSIS — J45.909: ICD-10-CM

## 2022-07-26 DIAGNOSIS — Z79.51: ICD-10-CM

## 2022-07-26 DIAGNOSIS — E78.5: ICD-10-CM

## 2022-07-26 DIAGNOSIS — Z51.5: ICD-10-CM

## 2022-07-26 DIAGNOSIS — Z88.0: ICD-10-CM

## 2022-07-26 DIAGNOSIS — R77.8: ICD-10-CM

## 2022-07-26 DIAGNOSIS — M19.90: ICD-10-CM

## 2022-07-26 DIAGNOSIS — N18.9: ICD-10-CM

## 2022-07-26 DIAGNOSIS — D02.20: ICD-10-CM

## 2022-07-26 DIAGNOSIS — R41.82: ICD-10-CM

## 2022-07-26 DIAGNOSIS — Z88.1: ICD-10-CM

## 2022-07-26 DIAGNOSIS — Z88.6: ICD-10-CM

## 2022-07-26 DIAGNOSIS — D72.829: ICD-10-CM

## 2022-07-26 DIAGNOSIS — K21.9: ICD-10-CM

## 2022-07-26 LAB
ALBUMIN SERPL-MCNC: 3 G/DL (ref 3.5–5)
ALP SERPL-CCNC: 113 U/L (ref 38–126)
ALT SERPL-CCNC: 15 U/L (ref 4–49)
ANION GAP SERPL CALC-SCNC: 0 MMOL/L
APTT BLD: 21.8 SEC (ref 22–30)
AST SERPL-CCNC: 19 U/L (ref 17–59)
BASOPHILS # BLD AUTO: 0 K/UL (ref 0–0.2)
BASOPHILS NFR BLD AUTO: 0 %
BUN SERPL-SCNC: 42 MG/DL (ref 9–20)
CALCIUM SPEC-MCNC: 12.6 MG/DL (ref 8.4–10.2)
CHLORIDE SERPL-SCNC: 95 MMOL/L (ref 98–107)
CK SERPL-CCNC: 41 U/L (ref 55–170)
CO2 SERPL-SCNC: 40 MMOL/L (ref 22–30)
EOSINOPHIL # BLD AUTO: 0.1 K/UL (ref 0–0.7)
EOSINOPHIL NFR BLD AUTO: 0 %
ERYTHROCYTE [DISTWIDTH] IN BLOOD BY AUTOMATED COUNT: 4.8 M/UL (ref 4.3–5.9)
ERYTHROCYTE [DISTWIDTH] IN BLOOD: 14.2 % (ref 11.5–15.5)
GLUCOSE SERPL-MCNC: 133 MG/DL (ref 74–99)
HCT VFR BLD AUTO: 43.6 % (ref 39–53)
HGB BLD-MCNC: 13.9 GM/DL (ref 13–17.5)
HYALINE CASTS UR QL AUTO: 1 /LPF (ref 0–2)
INR PPP: 1.1 (ref ?–1.2)
LACTATE BLDV-SCNC: 1.1 MMOL/L (ref 0.7–2)
LYMPHOCYTES # SPEC AUTO: 0.6 K/UL (ref 1–4.8)
LYMPHOCYTES NFR SPEC AUTO: 2 %
MAGNESIUM SPEC-SCNC: 2.3 MG/DL (ref 1.6–2.3)
MCH RBC QN AUTO: 28.9 PG (ref 25–35)
MCHC RBC AUTO-ENTMCNC: 31.9 G/DL (ref 31–37)
MCV RBC AUTO: 90.8 FL (ref 80–100)
MONOCYTES # BLD AUTO: 0.9 K/UL (ref 0–1)
MONOCYTES NFR BLD AUTO: 4 %
NEUTROPHILS # BLD AUTO: 23.9 K/UL (ref 1.3–7.7)
NEUTROPHILS NFR BLD AUTO: 94 %
PH UR: 5.5 [PH] (ref 5–8)
PLATELET # BLD AUTO: 219 K/UL (ref 150–450)
POTASSIUM SERPL-SCNC: 4.5 MMOL/L (ref 3.5–5.1)
PROT SERPL-MCNC: 5.5 G/DL (ref 6.3–8.2)
PROT UR QL: (no result)
PT BLD: 11.5 SEC (ref 9–12)
RBC UR QL: 7 /HPF (ref 0–5)
SODIUM SERPL-SCNC: 135 MMOL/L (ref 137–145)
SP GR UR: 1.01 (ref 1–1.03)
SQUAMOUS UR QL AUTO: <1 /HPF (ref 0–4)
UROBILINOGEN UR QL STRIP: <2 MG/DL (ref ?–2)
WBC # BLD AUTO: 25.5 K/UL (ref 3.8–10.6)
WBC # UR AUTO: 5 /HPF (ref 0–5)

## 2022-07-26 PROCEDURE — 84484 ASSAY OF TROPONIN QUANT: CPT

## 2022-07-26 PROCEDURE — 82550 ASSAY OF CK (CPK): CPT

## 2022-07-26 PROCEDURE — 81001 URINALYSIS AUTO W/SCOPE: CPT

## 2022-07-26 PROCEDURE — 99285 EMERGENCY DEPT VISIT HI MDM: CPT

## 2022-07-26 PROCEDURE — 93005 ELECTROCARDIOGRAM TRACING: CPT

## 2022-07-26 PROCEDURE — 85730 THROMBOPLASTIN TIME PARTIAL: CPT

## 2022-07-26 PROCEDURE — 80320 DRUG SCREEN QUANTALCOHOLS: CPT

## 2022-07-26 PROCEDURE — 70450 CT HEAD/BRAIN W/O DYE: CPT

## 2022-07-26 PROCEDURE — 71046 X-RAY EXAM CHEST 2 VIEWS: CPT

## 2022-07-26 PROCEDURE — 96361 HYDRATE IV INFUSION ADD-ON: CPT

## 2022-07-26 PROCEDURE — 80306 DRUG TEST PRSMV INSTRMNT: CPT

## 2022-07-26 PROCEDURE — 36415 COLL VENOUS BLD VENIPUNCTURE: CPT

## 2022-07-26 PROCEDURE — 80053 COMPREHEN METABOLIC PANEL: CPT

## 2022-07-26 PROCEDURE — 85025 COMPLETE CBC W/AUTO DIFF WBC: CPT

## 2022-07-26 PROCEDURE — 83735 ASSAY OF MAGNESIUM: CPT

## 2022-07-26 PROCEDURE — 87040 BLOOD CULTURE FOR BACTERIA: CPT

## 2022-07-26 PROCEDURE — 96374 THER/PROPH/DIAG INJ IV PUSH: CPT

## 2022-07-26 PROCEDURE — 85610 PROTHROMBIN TIME: CPT

## 2022-07-26 PROCEDURE — 82140 ASSAY OF AMMONIA: CPT

## 2022-07-26 PROCEDURE — 83605 ASSAY OF LACTIC ACID: CPT

## 2022-07-26 PROCEDURE — 83880 ASSAY OF NATRIURETIC PEPTIDE: CPT

## 2022-07-26 NOTE — CT
EXAMINATION TYPE: CT brain wo con

CT DLP: 1163.9 mGycm, Automated exposure control for dose reduction was used.

 

DATE OF EXAM: 7/26/2022 9:24 AM

 

COMPARISON: CT brain 7/23/2022 

 

CLINICAL INDICATION:Male, 76 years old with history of Mental status

 

TECHNIQUE: 

Brain: Multiple axial CT images of the brain were obtained without IV contrast. Coronal and sagittal 
reformats reviewed.

 

FINDINGS:

 

Brain:

Extra-axial spaces: No abnormal extra-axial fluid collections.

Ventricular system: Within normal limits

Cerebral parenchyma: No acute intraparenchymal hemorrhage or mass effect.  The gray-white junction is
 well differentiated. Scattered hypoattenuating areas are seen within the white matter.  Mild cerebra
l atrophy.

Cerebellum: Unremarkable.

Mass effect: No evidence of midline shift.

Intracranial vasculature: Atherosclerotic calcifications of the intracranial vessels.

Soft tissues: Normal.

Calvarium/osseous structures: No depressed skull fracture.

Paranasal sinuses and mastoid air cells: Clear

Visualized orbits: Orbital contents are intact.

 

 

IMPRESSION:

*  No acute intracranial process. No significant change from prior examination.

*  Nonspecific scattered hypoattenuating regions within the periventricular white matter likely relat
ed to chronic small vessel ischemic disease.

## 2022-07-26 NOTE — XR
EXAMINATION TYPE: XR chest 2V

 

DATE OF EXAM: 7/26/2022

 

COMPARISON: Chest x-ray 7/21/2022, PET/CT dated 7/8/2022

 

HISTORY: Altered mental status

 

TECHNIQUE:  Frontal and lateral views of the chest are obtained on 3 images.

 

FINDINGS:  Patient is post cardiac valve replacement. There is a generator in the left pectoral regio
n, leads are noted in the right atrium and ventricle. Heart is enlarged. There is prominence intersti
tium, perihilar vascular indistinctness and prominence the central vascularity. No evident pneumothor
ax. There is blunting the posterior costophrenic angles.

 

IMPRESSION:  Correlate for congestive heart failure, this cardiomegaly and small basilar effusions diggs
spected, interstitial lung disease. Postprocedural changes. Difficult to exclude pneumonia, patient w
ith known mediastinal mass.

## 2022-07-27 NOTE — CDI
Documentation Clarification Form



Date: 7/27/2022

From: MELINDA Rhoades

MRN: [id number]

Admit Date: 07/20/2022 05:50:00 PM

Patient Name: Link Brar

Visit Number: [id number]

Discharge Date:  07/25/2022 05:49:00 PM





ATTENTION: The Clinical Documentation Specialists (CDI) and [facility] Coding Staff 
appreciate your assistance in clarifying documentation. Please respond to the 
clarification below the line at the bottom and electronically sign. The CDI & 
[facility] Coding staff will review the response and follow-up if needed. Please note: 
Queries are made part of the Legal Health Record. If you have any questions, 
please contact the author of this message via ITS.



Dr. Diego Live MD





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 
Coding guidelines do not allow coding professionals to code based on pathology 
results; therefore, clarification is requested. 



History/risk factors: Right paratracheal lung mass suspicious for malignancy, 
recent history of hemoptysis.  Mediastinal mass.



Clinical Indicators:

CXRay IMPRESSION:

1.   Patchy bilateral infiltrate.Marked soft tissue prominence right

paratracheal region likely corresponds the previous CT described mass.





Please clarify if you agree with the pathology report diagnosis of non small 
cell carcinoma of the mediastinum:

[  ]  Yes

[  ]  No

[  ]  Other (please specify) ___________________

[  ]  Unable to determine





___________________________________________________________________________

MTDD